# Patient Record
Sex: MALE | Race: WHITE | NOT HISPANIC OR LATINO | Employment: OTHER | URBAN - NONMETROPOLITAN AREA
[De-identification: names, ages, dates, MRNs, and addresses within clinical notes are randomized per-mention and may not be internally consistent; named-entity substitution may affect disease eponyms.]

---

## 2023-05-03 LAB
ALANINE AMINOTRANSFERASE (SGPT) (U/L) IN SER/PLAS: 28 U/L (ref 10–52)
ALBUMIN (G/DL) IN SER/PLAS: 4.5 G/DL (ref 3.4–5)
ALKALINE PHOSPHATASE (U/L) IN SER/PLAS: 93 U/L (ref 33–120)
ANION GAP IN SER/PLAS: 12 MMOL/L (ref 10–20)
ASPARTATE AMINOTRANSFERASE (SGOT) (U/L) IN SER/PLAS: 17 U/L (ref 9–39)
BILIRUBIN TOTAL (MG/DL) IN SER/PLAS: 0.9 MG/DL (ref 0–1.2)
CALCIUM (MG/DL) IN SER/PLAS: 9.6 MG/DL (ref 8.6–10.3)
CARBON DIOXIDE, TOTAL (MMOL/L) IN SER/PLAS: 28 MMOL/L (ref 21–32)
CHLORIDE (MMOL/L) IN SER/PLAS: 104 MMOL/L (ref 98–107)
COBALAMIN (VITAMIN B12) (PG/ML) IN SER/PLAS: 898 PG/ML (ref 211–911)
CREATININE (MG/DL) IN SER/PLAS: 0.84 MG/DL (ref 0.5–1.3)
GFR MALE: >90 ML/MIN/1.73M2
GLUCOSE (MG/DL) IN SER/PLAS: 104 MG/DL (ref 74–99)
MAGNESIUM (MG/DL) IN SER/PLAS: 1.94 MG/DL (ref 1.6–2.4)
POTASSIUM (MMOL/L) IN SER/PLAS: 3.9 MMOL/L (ref 3.5–5.3)
PROTEIN TOTAL: 7.5 G/DL (ref 6.4–8.2)
SODIUM (MMOL/L) IN SER/PLAS: 140 MMOL/L (ref 136–145)
UREA NITROGEN (MG/DL) IN SER/PLAS: 12 MG/DL (ref 6–23)

## 2023-05-04 PROBLEM — E78.2 COMBINED HYPERLIPIDEMIA: Status: ACTIVE | Noted: 2023-05-04

## 2023-05-04 PROBLEM — L03.90 CELLULITIS: Status: ACTIVE | Noted: 2023-05-04

## 2023-05-04 PROBLEM — J84.10 GRANULOMATOUS LUNG DISEASE (MULTI): Status: ACTIVE | Noted: 2023-05-04

## 2023-05-04 PROBLEM — H52.13 BILATERAL MYOPIA: Status: ACTIVE | Noted: 2023-05-04

## 2023-05-04 PROBLEM — J45.40 MODERATE PERSISTENT ASTHMA WITHOUT COMPLICATION (HHS-HCC): Status: ACTIVE | Noted: 2023-05-04

## 2023-05-04 PROBLEM — K80.20 GALL STONES: Status: ACTIVE | Noted: 2023-05-04

## 2023-05-04 PROBLEM — L89.899: Status: ACTIVE | Noted: 2023-05-04

## 2023-05-04 PROBLEM — R59.0 MEDIASTINAL LYMPHADENOPATHY: Status: ACTIVE | Noted: 2023-05-04

## 2023-05-04 PROBLEM — Z89.519 S/P BKA (BELOW KNEE AMPUTATION) UNILATERAL (MULTI): Status: ACTIVE | Noted: 2023-05-04

## 2023-05-04 PROBLEM — Z89.612: Status: ACTIVE | Noted: 2023-05-04

## 2023-05-04 PROBLEM — K52.9 CHRONIC DIARRHEA: Status: ACTIVE | Noted: 2023-05-04

## 2023-05-04 PROBLEM — J45.50 SEVERE PERSISTENT ASTHMA (MULTI): Status: ACTIVE | Noted: 2023-05-04

## 2023-05-04 PROBLEM — L98.9 SKIN LESION: Status: ACTIVE | Noted: 2023-05-04

## 2023-05-04 PROBLEM — G47.30 SLEEP APNEA: Status: ACTIVE | Noted: 2023-05-04

## 2023-05-04 PROBLEM — R93.89 ABNORMAL CHEST X-RAY: Status: ACTIVE | Noted: 2023-05-04

## 2023-05-04 PROBLEM — M54.16 LUMBAR RADICULOPATHY: Status: ACTIVE | Noted: 2023-05-04

## 2023-05-04 PROBLEM — E78.00 PURE HYPERCHOLESTEROLEMIA: Status: ACTIVE | Noted: 2023-05-04

## 2023-05-04 PROBLEM — R91.8 LUNG NODULES: Status: ACTIVE | Noted: 2023-05-04

## 2023-05-04 PROBLEM — J45.41 MODERATE PERSISTENT ASTHMA WITH ACUTE EXACERBATION (HHS-HCC): Status: ACTIVE | Noted: 2023-05-04

## 2023-05-04 PROBLEM — D86.9 SARCOIDOSIS: Status: ACTIVE | Noted: 2023-05-04

## 2023-05-04 PROBLEM — J33.8 NASAL SINUS POLYP: Status: ACTIVE | Noted: 2023-05-04

## 2023-05-04 PROBLEM — M51.36 DDD (DEGENERATIVE DISC DISEASE), LUMBAR: Status: ACTIVE | Noted: 2023-05-04

## 2023-05-04 PROBLEM — M51.369 DDD (DEGENERATIVE DISC DISEASE), LUMBAR: Status: ACTIVE | Noted: 2023-05-04

## 2023-05-04 PROBLEM — J30.89 ALLERGIC RHINITIS DUE TO DUST: Status: ACTIVE | Noted: 2023-05-04

## 2023-05-04 PROBLEM — K92.1 BLOODY STOOLS: Status: ACTIVE | Noted: 2023-05-04

## 2023-05-04 PROBLEM — T84.9XXA: Status: ACTIVE | Noted: 2023-05-04

## 2023-05-04 PROBLEM — R43.0 ANOSMIA: Status: ACTIVE | Noted: 2023-05-04

## 2023-05-04 PROBLEM — I10 ESSENTIAL HYPERTENSION: Status: ACTIVE | Noted: 2023-05-04

## 2023-05-04 PROBLEM — T78.2XXA ANAPHYLACTIC SYNDROME: Status: ACTIVE | Noted: 2023-05-04

## 2023-05-04 PROBLEM — J45.909 ASTHMA (HHS-HCC): Status: ACTIVE | Noted: 2023-05-04

## 2023-05-04 PROBLEM — J34.2 NASAL SEPTAL DEVIATION: Status: ACTIVE | Noted: 2023-05-04

## 2023-05-04 PROBLEM — E55.9 VITAMIN D DEFICIENCY: Status: ACTIVE | Noted: 2023-05-04

## 2023-05-04 PROBLEM — Z86.2 HISTORY OF SARCOIDOSIS: Status: ACTIVE | Noted: 2023-05-04

## 2023-05-04 PROBLEM — K21.9 GERD WITHOUT ESOPHAGITIS: Status: ACTIVE | Noted: 2023-05-04

## 2023-05-04 PROBLEM — M54.9 BACK ARTHRALGIA: Status: ACTIVE | Noted: 2023-05-04

## 2023-05-04 RX ORDER — MONTELUKAST SODIUM 10 MG/1
1 TABLET ORAL DAILY
COMMUNITY
Start: 2016-04-15 | End: 2024-01-16

## 2023-05-04 RX ORDER — MUPIROCIN 20 MG/G
OINTMENT TOPICAL 2 TIMES DAILY
COMMUNITY
Start: 2022-01-10

## 2023-05-04 RX ORDER — TRIAMCINOLONE ACETONIDE 5 MG/G
CREAM TOPICAL 2 TIMES DAILY
COMMUNITY
Start: 2014-05-19

## 2023-05-04 RX ORDER — FLUOCINOLONE ACETONIDE 0.11 MG/ML
OIL AURICULAR (OTIC)
COMMUNITY
Start: 2021-12-11

## 2023-05-04 RX ORDER — FLUTICASONE PROPIONATE AND SALMETEROL 232; 14 UG/1; UG/1
POWDER, METERED RESPIRATORY (INHALATION)
COMMUNITY
Start: 2022-03-18 | End: 2023-12-19 | Stop reason: SDUPTHER

## 2023-05-04 RX ORDER — CLOTRIMAZOLE 1 %
CREAM (GRAM) TOPICAL
COMMUNITY
Start: 2019-01-31

## 2023-05-04 RX ORDER — FLUTICASONE PROPIONATE 93 UG/1
SPRAY, METERED NASAL
COMMUNITY
Start: 2021-03-08

## 2023-05-04 RX ORDER — EPINEPHRINE 0.3 MG/.3ML
INJECTION SUBCUTANEOUS
COMMUNITY
Start: 2017-04-12

## 2023-05-04 RX ORDER — PANTOPRAZOLE SODIUM 40 MG/1
TABLET, DELAYED RELEASE ORAL
COMMUNITY
Start: 2016-04-11 | End: 2023-07-03 | Stop reason: SDUPTHER

## 2023-05-04 RX ORDER — AMLODIPINE BESYLATE 5 MG/1
1 TABLET ORAL DAILY
COMMUNITY
Start: 2017-10-24 | End: 2023-05-09 | Stop reason: SDUPTHER

## 2023-05-04 RX ORDER — ASPIRIN 325 MG
325 TABLET ORAL 2 TIMES DAILY
COMMUNITY
Start: 2016-02-05

## 2023-05-04 RX ORDER — LISINOPRIL 20 MG/1
1 TABLET ORAL DAILY
COMMUNITY
Start: 2015-09-21 | End: 2023-05-17 | Stop reason: SDUPTHER

## 2023-05-04 RX ORDER — CYCLOBENZAPRINE HCL 5 MG
1 TABLET ORAL 3 TIMES DAILY PRN
COMMUNITY
Start: 2021-08-17

## 2023-05-04 RX ORDER — ATORVASTATIN CALCIUM 80 MG/1
1 TABLET, FILM COATED ORAL DAILY
COMMUNITY
Start: 2018-01-24 | End: 2023-05-17 | Stop reason: SDUPTHER

## 2023-05-04 RX ORDER — ALBUTEROL SULFATE 90 UG/1
AEROSOL, METERED RESPIRATORY (INHALATION)
COMMUNITY
Start: 2018-03-21

## 2023-05-04 RX ORDER — MULTIVITAMIN
TABLET ORAL
COMMUNITY
Start: 2022-05-09

## 2023-05-09 ENCOUNTER — OFFICE VISIT (OUTPATIENT)
Dept: PRIMARY CARE | Facility: CLINIC | Age: 43
End: 2023-05-09
Payer: COMMERCIAL

## 2023-05-09 VITALS
BODY MASS INDEX: 29.38 KG/M2 | OXYGEN SATURATION: 96 % | HEART RATE: 64 BPM | WEIGHT: 216.6 LBS | DIASTOLIC BLOOD PRESSURE: 72 MMHG | TEMPERATURE: 97.2 F | SYSTOLIC BLOOD PRESSURE: 110 MMHG

## 2023-05-09 DIAGNOSIS — R60.0 LOCALIZED EDEMA: Primary | ICD-10-CM

## 2023-05-09 DIAGNOSIS — Z86.2 HISTORY OF SARCOIDOSIS: ICD-10-CM

## 2023-05-09 DIAGNOSIS — Z89.612: ICD-10-CM

## 2023-05-09 DIAGNOSIS — J84.10 GRANULOMATOUS LUNG DISEASE (MULTI): ICD-10-CM

## 2023-05-09 PROCEDURE — 99214 OFFICE O/P EST MOD 30 MIN: CPT | Performed by: INTERNAL MEDICINE

## 2023-05-09 PROCEDURE — 3078F DIAST BP <80 MM HG: CPT | Performed by: INTERNAL MEDICINE

## 2023-05-09 PROCEDURE — 3074F SYST BP LT 130 MM HG: CPT | Performed by: INTERNAL MEDICINE

## 2023-05-09 PROCEDURE — 1036F TOBACCO NON-USER: CPT | Performed by: INTERNAL MEDICINE

## 2023-05-09 RX ORDER — AMLODIPINE BESYLATE 2.5 MG/1
2.5 TABLET ORAL DAILY
Qty: 90 TABLET | Refills: 1 | Status: SHIPPED | OUTPATIENT
Start: 2023-05-09 | End: 2023-08-10 | Stop reason: SINTOL

## 2023-05-09 ASSESSMENT — ENCOUNTER SYMPTOMS
COUGH: 0
DIZZINESS: 0
ARTHRALGIAS: 0
DIFFICULTY URINATING: 0
HYPERTENSION: 1
HEADACHES: 0
FEVER: 0
FATIGUE: 0
SINUS PAIN: 0
BLOOD IN STOOL: 0
SORE THROAT: 0
DIARRHEA: 0
BRUISES/BLEEDS EASILY: 0
WHEEZING: 0
PALPITATIONS: 0
UNEXPECTED WEIGHT CHANGE: 0
ABDOMINAL PAIN: 0

## 2023-05-09 NOTE — PROGRESS NOTES
Subjective   Patient ID: Wale Cali is a 43 y.o. male who presents for Results, Edema (Rt foot at end of day), Hyperlipidemia, Asthma, and Hypertension.  Lab Results   Component Value Date    WBC 5.6 02/24/2023    HGB 15.2 02/24/2023    HCT 44.7 02/24/2023     02/24/2023    CHOL 101 05/09/2022    TRIG 80 05/09/2022    HDL 34.0 (A) 05/09/2022    ALT 28 05/03/2023    AST 17 05/03/2023     05/03/2023    K 3.9 05/03/2023     05/03/2023    CREATININE 0.84 05/03/2023    BUN 12 05/03/2023    CO2 28 05/03/2023    TSH 2.30 05/09/2022    INR 1.1 06/10/2021     par   Patient comes today for review blood work  - Blood work reviewed uncontrolled  - Increased leg edema by the end of the day examination trace edema in right leg left below-knee amputation  -Patient counseled about amlodipine side effect and may discontinue 5 mg  Switch patient to 2.5 mg daily follow-up in 3 months to reevaluate edema and swelling and blood pressure  - Spiculated lung lesion follow-up pulmonary, most likely inflammatory seen by pulmonary service recommendation to repeat CT in June follow-up results closely  -Granulomatous lung disease Samter's syndrome between (nasal polyps and asthma aspirin sensitivity,) follow-up pulmonary in January as a scheduled after repeating scan  -Underlying sleep apnea follow-up otolaryngology  -Hypercholesterolemia controlled   -Hypertension controlled continue with amlodipine 5 mg daily  Vitamin D deficiency need to start on vitamin D 5000 daily  -Chronic reflux disease status post upper endoscopy symptoms are controlled only now with pantoprazole on diet-controlled  -Follow-up 3 months      Edema    Pertinent negative symptoms include no abdominal pain, no chest pain, no cough, no fatigue, no fever and no palpitations.   Hyperlipidemia  Pertinent negatives include no chest pain.   Asthma  There is no cough or wheezing. Pertinent negatives include no chest pain, ear pain, fever,  headaches or sore throat. His past medical history is significant for asthma.   Hypertension  Pertinent negatives include no chest pain, headaches or palpitations.          Review of Systems   Constitutional:  Negative for fatigue, fever and unexpected weight change.   HENT:  Negative for congestion, ear discharge, ear pain, mouth sores, sinus pain and sore throat.    Eyes:  Negative for visual disturbance.   Respiratory:  Negative for cough and wheezing.    Cardiovascular:  Positive for leg swelling. Negative for chest pain and palpitations.   Gastrointestinal:  Negative for abdominal pain, blood in stool and diarrhea.   Genitourinary:  Negative for difficulty urinating.   Musculoskeletal:  Negative for arthralgias.   Skin:  Negative for rash.   Neurological:  Negative for dizziness and headaches.   Hematological:  Does not bruise/bleed easily.   Psychiatric/Behavioral:  Negative for behavioral problems.    All other systems reviewed and are negative.      Objective   No results found for: HGBA1C   /72   Pulse 64   Temp 36.2 °C (97.2 °F)   Wt 98.2 kg (216 lb 9.6 oz)   SpO2 96%   BMI 29.38 kg/m²     Physical Exam  Vitals and nursing note reviewed.   Constitutional:       Appearance: Normal appearance.   HENT:      Head: Normocephalic.      Nose: Nose normal.   Eyes:      Conjunctiva/sclera: Conjunctivae normal.      Pupils: Pupils are equal, round, and reactive to light.   Cardiovascular:      Rate and Rhythm: Regular rhythm.   Pulmonary:      Effort: Pulmonary effort is normal.      Breath sounds: Normal breath sounds.   Abdominal:      General: Abdomen is flat.      Palpations: Abdomen is soft.   Musculoskeletal:         General: Deformity (Left below-knee amputation right leg multiple scar tissue from previous surgery) present.      Cervical back: Neck supple.      Right lower leg: Edema present.   Skin:     General: Skin is warm.   Neurological:      General: No focal deficit present.      Mental  Status: He is oriented to person, place, and time.   Psychiatric:         Mood and Affect: Mood normal.         Assessment/Plan   Wale was seen today for results, edema, hyperlipidemia, asthma and hypertension.  Diagnoses and all orders for this visit:  Localized edema (Primary)  -     amLODIPine (Norvasc) 2.5 mg tablet; Take 1 tablet (2.5 mg) by mouth once daily.  Granulomatous lung disease (CMS/HCC)  History of left lower extremity amputation (CMS/HCC)  History of sarcoidosis  Other orders  -     Follow Up In Primary Care; Future  -     Follow Up In Primary Care; Future   Patient comes today for review blood work  - Blood work reviewed uncontrolled  - Increased leg edema by the end of the day examination trace edema in right leg left below-knee amputation  -Patient counseled about amlodipine side effect and may discontinue 5 mg  Switch patient to 2.5 mg daily follow-up in 3 months to reevaluate edema and swelling and blood pressure  - Spiculated lung lesion follow-up pulmonary, most likely inflammatory seen by pulmonary service recommendation to repeat CT in June follow-up results closely  -Granulomatous lung disease Samter's syndrome between (nasal polyps and asthma aspirin sensitivity,) follow-up pulmonary in January as a scheduled after repeating scan  -Underlying sleep apnea follow-up otolaryngology  -Hypercholesterolemia controlled   -Hypertension controlled continue with amlodipine 5 mg daily  Vitamin D deficiency need to start on vitamin D 5000 daily  -Chronic reflux disease status post upper endoscopy symptoms are controlled only now with pantoprazole on diet-controlled  -Follow-up 3 months

## 2023-05-17 DIAGNOSIS — E78.00 PURE HYPERCHOLESTEROLEMIA: ICD-10-CM

## 2023-05-17 DIAGNOSIS — I10 ESSENTIAL HYPERTENSION: ICD-10-CM

## 2023-05-17 RX ORDER — ATORVASTATIN CALCIUM 80 MG/1
TABLET, FILM COATED ORAL
Qty: 90 TABLET | Refills: 0 | Status: SHIPPED | OUTPATIENT
Start: 2023-05-17 | End: 2023-08-10 | Stop reason: SDUPTHER

## 2023-05-17 RX ORDER — LISINOPRIL 20 MG/1
TABLET ORAL
Qty: 90 TABLET | Refills: 0 | Status: SHIPPED | OUTPATIENT
Start: 2023-05-17 | End: 2023-08-10 | Stop reason: SDUPTHER

## 2023-07-01 DIAGNOSIS — K21.9 GERD WITHOUT ESOPHAGITIS: ICD-10-CM

## 2023-07-03 RX ORDER — PANTOPRAZOLE SODIUM 40 MG/1
TABLET, DELAYED RELEASE ORAL
Qty: 90 TABLET | Refills: 0 | Status: SHIPPED | OUTPATIENT
Start: 2023-07-03 | End: 2023-10-09

## 2023-08-10 ENCOUNTER — OFFICE VISIT (OUTPATIENT)
Dept: PRIMARY CARE | Facility: CLINIC | Age: 43
End: 2023-08-10
Payer: COMMERCIAL

## 2023-08-10 VITALS
DIASTOLIC BLOOD PRESSURE: 70 MMHG | BODY MASS INDEX: 29.54 KG/M2 | OXYGEN SATURATION: 97 % | HEART RATE: 58 BPM | TEMPERATURE: 98.6 F | WEIGHT: 217.8 LBS | SYSTOLIC BLOOD PRESSURE: 120 MMHG

## 2023-08-10 DIAGNOSIS — J84.10 GRANULOMATOUS LUNG DISEASE (MULTI): ICD-10-CM

## 2023-08-10 DIAGNOSIS — E78.00 PURE HYPERCHOLESTEROLEMIA: ICD-10-CM

## 2023-08-10 DIAGNOSIS — Z89.612: ICD-10-CM

## 2023-08-10 DIAGNOSIS — I10 ESSENTIAL HYPERTENSION: Primary | ICD-10-CM

## 2023-08-10 DIAGNOSIS — R60.0 LOCALIZED EDEMA: ICD-10-CM

## 2023-08-10 PROCEDURE — 1036F TOBACCO NON-USER: CPT | Performed by: INTERNAL MEDICINE

## 2023-08-10 PROCEDURE — 3074F SYST BP LT 130 MM HG: CPT | Performed by: INTERNAL MEDICINE

## 2023-08-10 PROCEDURE — 3078F DIAST BP <80 MM HG: CPT | Performed by: INTERNAL MEDICINE

## 2023-08-10 PROCEDURE — 99214 OFFICE O/P EST MOD 30 MIN: CPT | Performed by: INTERNAL MEDICINE

## 2023-08-10 RX ORDER — AMLODIPINE BESYLATE 2.5 MG/1
2.5 TABLET ORAL DAILY
Qty: 90 TABLET | Refills: 1 | Status: CANCELLED | OUTPATIENT
Start: 2023-08-10 | End: 2024-02-06

## 2023-08-10 RX ORDER — ATORVASTATIN CALCIUM 80 MG/1
80 TABLET, FILM COATED ORAL DAILY
Qty: 90 TABLET | Refills: 0 | Status: SHIPPED | OUTPATIENT
Start: 2023-08-10 | End: 2023-11-20

## 2023-08-10 RX ORDER — LISINOPRIL 20 MG/1
20 TABLET ORAL DAILY
Qty: 90 TABLET | Refills: 0 | Status: SHIPPED | OUTPATIENT
Start: 2023-08-10 | End: 2023-11-20

## 2023-08-10 ASSESSMENT — ENCOUNTER SYMPTOMS
HEADACHES: 0
ARTHRALGIAS: 0
WHEEZING: 0
PALPITATIONS: 0
UNEXPECTED WEIGHT CHANGE: 0
DIZZINESS: 0
SORE THROAT: 0
BLOOD IN STOOL: 0
FEVER: 0
FATIGUE: 0
BRUISES/BLEEDS EASILY: 0
SINUS PAIN: 0
ABDOMINAL PAIN: 0
COUGH: 0
DIFFICULTY URINATING: 0
DIARRHEA: 0

## 2023-08-10 ASSESSMENT — PATIENT HEALTH QUESTIONNAIRE - PHQ9
2. FEELING DOWN, DEPRESSED OR HOPELESS: NOT AT ALL
SUM OF ALL RESPONSES TO PHQ9 QUESTIONS 1 AND 2: 0
1. LITTLE INTEREST OR PLEASURE IN DOING THINGS: NOT AT ALL

## 2023-08-10 NOTE — PROGRESS NOTES
Subjective   Patient ID: Wale Cali is a 43 y.o. male who presents for Follow-up (3 mth follow up. ), Medication Problem (Amlodipine was to be cut in half the last time he was here. Foot is still swollen. His father's foot gets swollen when was on amlodipine. Could it be hereditary?  ), and bumps (On hand.).     -Leg edema did not improve after cutting down amlodipine  Need to discontinue amlodipine since her blood pressure is doing very well  Hypertension controlled to continue with lisinopril reevaluate patient in 3 months  - Spiculated lung lesion follow-up pulmonary, most likely inflammatory seen by pulmonary service recommendation to repeat CT in June follow-up results closely  -Granulomatous lung disease Samter's syndrome between (nasal polyps and asthma aspirin sensitivity,) follow-up pulmonary in January as a scheduled after repeating scan  -Underlying sleep apnea follow-up otolaryngology  -Hypercholesterolemia controlled   -Vitamin D deficiency need to start on vitamin D 5000 daily  -Chronic reflux disease status post upper endoscopy symptoms are controlled only now with pantoprazole on diet-controlled  -Follow-up 3 months                Review of Systems   Constitutional:  Negative for fatigue, fever and unexpected weight change.   HENT:  Negative for congestion, ear discharge, ear pain, mouth sores, sinus pain and sore throat.    Eyes:  Negative for visual disturbance.   Respiratory:  Negative for cough and wheezing.    Cardiovascular:  Positive for leg swelling. Negative for chest pain and palpitations.   Gastrointestinal:  Negative for abdominal pain, blood in stool and diarrhea.   Genitourinary:  Negative for difficulty urinating.   Musculoskeletal:  Negative for arthralgias.   Skin:  Negative for rash.   Neurological:  Negative for dizziness and headaches.   Hematological:  Does not bruise/bleed easily.   Psychiatric/Behavioral:  Negative for behavioral problems.    All other systems  "reviewed and are negative.      Objective   No results found for: \"HGBA1C\"   /70   Pulse 58   Temp 37 °C (98.6 °F)   Wt 98.8 kg (217 lb 12.8 oz)   SpO2 97%   BMI 29.54 kg/m²     Physical Exam  Vitals and nursing note reviewed.   Constitutional:       Appearance: Normal appearance.   HENT:      Head: Normocephalic.      Nose: Nose normal.   Eyes:      Conjunctiva/sclera: Conjunctivae normal.      Pupils: Pupils are equal, round, and reactive to light.   Cardiovascular:      Rate and Rhythm: Regular rhythm.   Pulmonary:      Effort: Pulmonary effort is normal.      Breath sounds: Normal breath sounds.   Abdominal:      General: Abdomen is flat.      Palpations: Abdomen is soft.   Musculoskeletal:      Cervical back: Neck supple.      Right lower leg: Edema present.      Comments: Left below knee amputation   Skin:     General: Skin is warm.   Neurological:      General: No focal deficit present.      Mental Status: He is oriented to person, place, and time.   Psychiatric:         Mood and Affect: Mood normal.         Assessment/Plan   Wale was seen today for follow-up, medication problem and bumps.  Diagnoses and all orders for this visit:  Essential hypertension (Primary)  -     lisinopril 20 mg tablet; Take 1 tablet (20 mg) by mouth once daily.  Localized edema  Pure hypercholesterolemia  -     atorvastatin (Lipitor) 80 mg tablet; Take 1 tablet (80 mg) by mouth once daily.  History of left lower extremity amputation (CMS/HCC)  Granulomatous lung disease (CMS/HCC)  Other orders  -     Follow Up In Primary Care - Established; Future    -Leg edema did not improve after cutting down amlodipine  Need to discontinue amlodipine since her blood pressure is doing very well  Hypertension controlled to continue with lisinopril reevaluate patient in 3 months  - Spiculated lung lesion follow-up pulmonary, most likely inflammatory seen by pulmonary service recommendation to repeat CT in June follow-up results " closely  -Granulomatous lung disease Samter's syndrome between (nasal polyps and asthma aspirin sensitivity,) follow-up pulmonary in January as a scheduled after repeating scan  -Underlying sleep apnea follow-up otolaryngology  -Hypercholesterolemia controlled   -Vitamin D deficiency need to start on vitamin D 5000 daily  -Chronic reflux disease status post upper endoscopy symptoms are controlled only now with pantoprazole on diet-controlled  -Follow-up 3 months

## 2023-10-04 ENCOUNTER — CLINICAL SUPPORT (OUTPATIENT)
Dept: ALLERGY | Facility: CLINIC | Age: 43
End: 2023-10-04
Payer: COMMERCIAL

## 2023-10-04 DIAGNOSIS — T78.2XXA ANAPHYLAXIS, INITIAL ENCOUNTER: ICD-10-CM

## 2023-10-04 DIAGNOSIS — J30.89 NON-SEASONAL ALLERGIC RHINITIS, UNSPECIFIED TRIGGER: ICD-10-CM

## 2023-10-04 DIAGNOSIS — Z91.09 OTHER ALLERGY, OTHER THAN TO MEDICINAL AGENTS: ICD-10-CM

## 2023-10-04 DIAGNOSIS — J30.81 ALLERGIC RHINITIS DUE TO ANIMAL (CAT) (DOG) HAIR AND DANDER: ICD-10-CM

## 2023-10-04 DIAGNOSIS — J45.50 SEVERE PERSISTENT ASTHMA, UNSPECIFIED WHETHER COMPLICATED (MULTI): ICD-10-CM

## 2023-10-04 DIAGNOSIS — J30.9 ALLERGIC RHINITIS, UNSPECIFIED SEASONALITY, UNSPECIFIED TRIGGER: ICD-10-CM

## 2023-10-04 PROCEDURE — 95117 IMMUNOTHERAPY INJECTIONS: CPT | Performed by: ALLERGY & IMMUNOLOGY

## 2023-10-04 PROCEDURE — 96401 CHEMO ANTI-NEOPL SQ/IM: CPT | Performed by: ALLERGY & IMMUNOLOGY

## 2023-10-04 PROCEDURE — 96372 THER/PROPH/DIAG INJ SC/IM: CPT | Performed by: ALLERGY & IMMUNOLOGY

## 2023-10-07 DIAGNOSIS — K21.9 GERD WITHOUT ESOPHAGITIS: ICD-10-CM

## 2023-10-09 RX ORDER — PANTOPRAZOLE SODIUM 40 MG/1
TABLET, DELAYED RELEASE ORAL
Qty: 90 TABLET | Refills: 0 | Status: SHIPPED | OUTPATIENT
Start: 2023-10-09 | End: 2024-01-15

## 2023-11-01 ENCOUNTER — CLINICAL SUPPORT (OUTPATIENT)
Dept: ALLERGY | Facility: CLINIC | Age: 43
End: 2023-11-01
Payer: COMMERCIAL

## 2023-11-01 DIAGNOSIS — J30.89 NON-SEASONAL ALLERGIC RHINITIS, UNSPECIFIED TRIGGER: ICD-10-CM

## 2023-11-01 DIAGNOSIS — J30.81 ALLERGIC RHINITIS DUE TO ANIMAL (CAT) (DOG) HAIR AND DANDER: ICD-10-CM

## 2023-11-01 DIAGNOSIS — J30.9 ALLERGIC RHINITIS, UNSPECIFIED SEASONALITY, UNSPECIFIED TRIGGER: ICD-10-CM

## 2023-11-01 PROCEDURE — 95117 IMMUNOTHERAPY INJECTIONS: CPT | Performed by: ALLERGY & IMMUNOLOGY

## 2023-11-16 ENCOUNTER — OFFICE VISIT (OUTPATIENT)
Dept: PRIMARY CARE | Facility: CLINIC | Age: 43
End: 2023-11-16
Payer: COMMERCIAL

## 2023-11-16 ENCOUNTER — HOSPITAL ENCOUNTER (OUTPATIENT)
Dept: RADIOLOGY | Facility: HOSPITAL | Age: 43
Discharge: HOME | End: 2023-11-16
Payer: COMMERCIAL

## 2023-11-16 VITALS
HEIGHT: 70 IN | WEIGHT: 222.2 LBS | SYSTOLIC BLOOD PRESSURE: 118 MMHG | HEART RATE: 72 BPM | OXYGEN SATURATION: 97 % | TEMPERATURE: 97.7 F | BODY MASS INDEX: 31.81 KG/M2 | DIASTOLIC BLOOD PRESSURE: 84 MMHG

## 2023-11-16 DIAGNOSIS — M25.521 RIGHT ELBOW PAIN: ICD-10-CM

## 2023-11-16 DIAGNOSIS — I10 ESSENTIAL HYPERTENSION: ICD-10-CM

## 2023-11-16 DIAGNOSIS — Z00.00 ANNUAL PHYSICAL EXAM: Primary | ICD-10-CM

## 2023-11-16 DIAGNOSIS — E78.00 PURE HYPERCHOLESTEROLEMIA: ICD-10-CM

## 2023-11-16 DIAGNOSIS — J84.10 GRANULOMATOUS LUNG DISEASE (MULTI): ICD-10-CM

## 2023-11-16 DIAGNOSIS — Z86.2 HISTORY OF SARCOIDOSIS: ICD-10-CM

## 2023-11-16 DIAGNOSIS — J45.40 MODERATE PERSISTENT ASTHMA WITHOUT COMPLICATION (HHS-HCC): ICD-10-CM

## 2023-11-16 DIAGNOSIS — L60.0 INGROWING TOENAIL OF RIGHT FOOT: ICD-10-CM

## 2023-11-16 PROCEDURE — 73080 X-RAY EXAM OF ELBOW: CPT | Mod: RT,FY

## 2023-11-16 PROCEDURE — 99214 OFFICE O/P EST MOD 30 MIN: CPT | Performed by: INTERNAL MEDICINE

## 2023-11-16 PROCEDURE — 3079F DIAST BP 80-89 MM HG: CPT | Performed by: INTERNAL MEDICINE

## 2023-11-16 PROCEDURE — 1036F TOBACCO NON-USER: CPT | Performed by: INTERNAL MEDICINE

## 2023-11-16 PROCEDURE — 73080 X-RAY EXAM OF ELBOW: CPT | Mod: RIGHT SIDE | Performed by: RADIOLOGY

## 2023-11-16 PROCEDURE — 99396 PREV VISIT EST AGE 40-64: CPT | Performed by: INTERNAL MEDICINE

## 2023-11-16 PROCEDURE — 3074F SYST BP LT 130 MM HG: CPT | Performed by: INTERNAL MEDICINE

## 2023-11-16 RX ORDER — METHYLPREDNISOLONE 4 MG/1
TABLET ORAL
Qty: 21 TABLET | Refills: 0 | Status: SHIPPED | OUTPATIENT
Start: 2023-11-16 | End: 2023-11-23

## 2023-11-16 ASSESSMENT — ENCOUNTER SYMPTOMS
UNEXPECTED WEIGHT CHANGE: 0
FEVER: 0
COLOR CHANGE: 0
SINUS PAIN: 0
BLOOD IN STOOL: 0
DIARRHEA: 0
WHEEZING: 1
LOSS OF SENSATION IN FEET: 1
DEPRESSION: 0
COUGH: 0
HEADACHES: 0
FATIGUE: 0
PALPITATIONS: 0
ABDOMINAL PAIN: 0
OCCASIONAL FEELINGS OF UNSTEADINESS: 0
BRUISES/BLEEDS EASILY: 0
SORE THROAT: 0
DIFFICULTY URINATING: 0
ARTHRALGIAS: 1
DIZZINESS: 0

## 2023-11-16 ASSESSMENT — PATIENT HEALTH QUESTIONNAIRE - PHQ9
1. LITTLE INTEREST OR PLEASURE IN DOING THINGS: NOT AT ALL
SUM OF ALL RESPONSES TO PHQ9 QUESTIONS 1 AND 2: 0
2. FEELING DOWN, DEPRESSED OR HOPELESS: NOT AT ALL

## 2023-11-16 ASSESSMENT — COLUMBIA-SUICIDE SEVERITY RATING SCALE - C-SSRS
2. HAVE YOU ACTUALLY HAD ANY THOUGHTS OF KILLING YOURSELF?: NO
1. IN THE PAST MONTH, HAVE YOU WISHED YOU WERE DEAD OR WISHED YOU COULD GO TO SLEEP AND NOT WAKE UP?: NO
6. HAVE YOU EVER DONE ANYTHING, STARTED TO DO ANYTHING, OR PREPARED TO DO ANYTHING TO END YOUR LIFE?: NO

## 2023-11-16 NOTE — PATIENT INSTRUCTIONS

## 2023-11-16 NOTE — PROGRESS NOTES
Subjective   Patient ID: Wale Cali is a 43 y.o. male who presents for Annual Exam, Asthma (Ears itchy, asthma acting up, trouble breathing, had leftover steroid and it helped), Ingrown Toenail (Referral to Dr. Cazares), Elbow Pain (Right elbow, burning, pain when leans on it), and Flu Vaccine (Decline).      -Annual preventive visit  -Vaccinations reviewed patient declined vaccine declined flu vaccine aware of risk and benefit  Needs screening colonoscopy  Declined at this time  - Needs complete blood work    Follow-up  -Right big toe ingrown toenail patient need to be referred to podiatry for further recommendation  - Right elbow pain and burning sensation on touching the elbow it is unclear to me to obtain x-ray  Refer patient to orthopedic surgery  -Hypertension controlled to continue with lisinopril reevaluate patient in 3 months  Patient off amlodipine edema improved  - Spiculated lung lesion follow-up pulmonary, most likely inflammatory seen by pulmonary service recommendation CT showed granulomatous lung disease follow-up CT per pulmonary recommendation  t-Granulomatous lung disease Samter's syndrome between (nasal polyps and asthma aspirin sensitivity,) follow-up pulmonary in January as a scheduled after repeating scan  - Underlying asthma continue with current inhaler  Flareup add Medrol Dosepak  -Underlying sleep apnea follow-up otolaryngology  -Hypercholesterolemia controlled   -Vitamin D deficiency need to start on vitamin D 5000 daily  -Chronic reflux disease status post upper endoscopy symptoms are controlled only now with pantoprazole on diet-controlled  -Follow-up 3 months      Asthma  He complains of wheezing. There is no cough. Pertinent negatives include no chest pain, ear pain, fever, headaches or sore throat. His past medical history is significant for asthma.   Elbow Pain  Associated symptoms include arthralgias. Pertinent negatives include no abdominal pain, chest pain,  "congestion, coughing, fatigue, fever, headaches, rash or sore throat.          Review of Systems   Constitutional:  Negative for fatigue, fever and unexpected weight change.   HENT:  Negative for congestion, ear discharge, ear pain, mouth sores, sinus pain and sore throat.    Eyes:  Negative for visual disturbance.   Respiratory:  Positive for wheezing. Negative for cough.    Cardiovascular:  Negative for chest pain, palpitations and leg swelling.   Gastrointestinal:  Negative for abdominal pain, blood in stool and diarrhea.   Genitourinary:  Negative for difficulty urinating.   Musculoskeletal:  Positive for arthralgias.   Skin:  Negative for color change and rash.   Neurological:  Negative for dizziness and headaches.   Hematological:  Does not bruise/bleed easily.   Psychiatric/Behavioral:  Negative for behavioral problems.    All other systems reviewed and are negative.      Objective   No results found for: \"HGBA1C\"   /84   Pulse 72   Temp 36.5 °C (97.7 °F)   Ht 1.778 m (5' 10\")   Wt 101 kg (222 lb 3.2 oz)   SpO2 97%   BMI 31.88 kg/m²   Lab Results   Component Value Date    WBC 5.6 02/24/2023    HGB 15.2 02/24/2023    HCT 44.7 02/24/2023     02/24/2023    CHOL 101 05/09/2022    TRIG 80 05/09/2022    HDL 34.0 (A) 05/09/2022    ALT 28 05/03/2023    AST 17 05/03/2023     05/03/2023    K 3.9 05/03/2023     05/03/2023    CREATININE 0.84 05/03/2023    BUN 12 05/03/2023    CO2 28 05/03/2023    TSH 2.30 05/09/2022    INR 1.1 06/10/2021     par   Physical Exam  Vitals and nursing note reviewed.   Constitutional:       Appearance: Normal appearance.   HENT:      Head: Normocephalic.      Nose: Nose normal.   Eyes:      Conjunctiva/sclera: Conjunctivae normal.      Pupils: Pupils are equal, round, and reactive to light.   Cardiovascular:      Rate and Rhythm: Regular rhythm.   Pulmonary:      Effort: Pulmonary effort is normal.      Breath sounds: Normal breath sounds.   Abdominal:      " General: Abdomen is flat.      Palpations: Abdomen is soft.   Musculoskeletal:         General: Tenderness (Right ingrowing toenail) present.      Cervical back: Neck supple.   Skin:     General: Skin is warm.      Comments: Right ingrown toenail   Neurological:      General: No focal deficit present.      Mental Status: He is oriented to person, place, and time.   Psychiatric:         Mood and Affect: Mood normal.         Assessment/Plan   Wale was seen today for annual exam, asthma, ingrown toenail, elbow pain and flu vaccine.  Diagnoses and all orders for this visit:  Annual physical exam (Primary)  -     CBC and Auto Differential; Future  -     Comprehensive Metabolic Panel; Future  -     Hemoglobin A1C; Future  -     Lipid Panel; Future  -     TSH with reflex to Free T4 if abnormal; Future  Granulomatous lung disease (CMS/HCC)  Ingrowing toenail of right foot  -     Referral to Podiatry; Future  Right elbow pain  -     Referral to Orthopaedic Surgery; Future  -     XR elbow right T 3+ views; Future  Moderate persistent asthma without complication  -     methylPREDNISolone (Medrol Dospak) 4 mg tablets; Follow schedule on package instructions  Essential hypertension  History of sarcoidosis  Pure hypercholesterolemia  Other orders  -     Follow Up In Primary Care - Established; Future   Patient was identified as a fall risk. Risk prevention instructions provided.  -Annual preventive visit  -Vaccinations reviewed patient declined vaccine declined flu vaccine aware of risk and benefit  Needs screening colonoscopy  Declined at this time  - Needs complete blood work    Follow-up  -Right big toe ingrown toenail patient need to be referred to podiatry for further recommendation  - Right elbow pain and burning sensation on touching the elbow it is unclear to me to obtain x-ray  Refer patient to orthopedic surgery  -Hypertension controlled to continue with lisinopril reevaluate patient in 3 months  Patient off  amlodipine edema improved  - Spiculated lung lesion follow-up pulmonary, most likely inflammatory seen by pulmonary service recommendation CT showed granulomatous lung disease follow-up CT per pulmonary recommendation  t-Granulomatous lung disease Samter's syndrome between (nasal polyps and asthma aspirin sensitivity,) follow-up pulmonary in January as a scheduled after repeating scan  - Underlying asthma continue with current inhaler  Flareup add Medrol Dosepak  -Underlying sleep apnea follow-up otolaryngology  -Hypercholesterolemia controlled   -Vitamin D deficiency need to start on vitamin D 5000 daily  -Chronic reflux disease status post upper endoscopy symptoms are controlled only now with pantoprazole on diet-controlled  -Follow-up 3 months

## 2023-11-18 DIAGNOSIS — I10 ESSENTIAL HYPERTENSION: ICD-10-CM

## 2023-11-18 DIAGNOSIS — E78.00 PURE HYPERCHOLESTEROLEMIA: ICD-10-CM

## 2023-11-20 RX ORDER — LISINOPRIL 20 MG/1
20 TABLET ORAL DAILY
Qty: 90 TABLET | Refills: 1 | Status: SHIPPED | OUTPATIENT
Start: 2023-11-20 | End: 2024-02-15 | Stop reason: SDUPTHER

## 2023-11-20 RX ORDER — ATORVASTATIN CALCIUM 80 MG/1
80 TABLET, FILM COATED ORAL DAILY
Qty: 90 TABLET | Refills: 1 | Status: SHIPPED | OUTPATIENT
Start: 2023-11-20 | End: 2024-02-15 | Stop reason: SDUPTHER

## 2023-12-14 ENCOUNTER — OFFICE VISIT (OUTPATIENT)
Dept: ORTHOPEDIC SURGERY | Facility: CLINIC | Age: 43
End: 2023-12-14
Payer: COMMERCIAL

## 2023-12-14 VITALS — BODY MASS INDEX: 31.78 KG/M2 | WEIGHT: 222 LBS | HEIGHT: 70 IN

## 2023-12-14 DIAGNOSIS — M77.11 LATERAL EPICONDYLITIS OF RIGHT ELBOW: ICD-10-CM

## 2023-12-14 PROCEDURE — 1036F TOBACCO NON-USER: CPT | Performed by: ORTHOPAEDIC SURGERY

## 2023-12-14 PROCEDURE — 99203 OFFICE O/P NEW LOW 30 MIN: CPT | Performed by: ORTHOPAEDIC SURGERY

## 2023-12-14 ASSESSMENT — PAIN - FUNCTIONAL ASSESSMENT: PAIN_FUNCTIONAL_ASSESSMENT: NO/DENIES PAIN
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
## 2023-12-19 ENCOUNTER — TELEMEDICINE (OUTPATIENT)
Dept: PRIMARY CARE | Facility: CLINIC | Age: 43
End: 2023-12-19
Payer: COMMERCIAL

## 2023-12-19 DIAGNOSIS — J45.40 MODERATE PERSISTENT ASTHMA WITHOUT COMPLICATION (HHS-HCC): Primary | ICD-10-CM

## 2023-12-19 DIAGNOSIS — J84.10 GRANULOMATOUS LUNG DISEASE (MULTI): ICD-10-CM

## 2023-12-19 DIAGNOSIS — I10 ESSENTIAL HYPERTENSION: ICD-10-CM

## 2023-12-19 PROCEDURE — 99213 OFFICE O/P EST LOW 20 MIN: CPT | Performed by: INTERNAL MEDICINE

## 2023-12-19 RX ORDER — PREDNISONE 20 MG/1
20 TABLET ORAL DAILY
Qty: 7 TABLET | Refills: 0 | Status: SHIPPED | OUTPATIENT
Start: 2023-12-19 | End: 2023-12-26

## 2023-12-19 RX ORDER — AMOXICILLIN AND CLAVULANATE POTASSIUM 875; 125 MG/1; MG/1
875 TABLET, FILM COATED ORAL 2 TIMES DAILY
Qty: 20 TABLET | Refills: 0 | Status: SHIPPED | OUTPATIENT
Start: 2023-12-19 | End: 2023-12-29

## 2023-12-19 RX ORDER — FLUTICASONE PROPIONATE AND SALMETEROL 232; 14 UG/1; UG/1
1 POWDER, METERED RESPIRATORY (INHALATION)
Qty: 1 EACH | Refills: 1 | Status: SHIPPED | OUTPATIENT
Start: 2023-12-19 | End: 2024-02-27

## 2023-12-19 RX ORDER — FLUTICASONE PROPIONATE AND SALMETEROL 232; 14 UG/1; UG/1
1 POWDER, METERED RESPIRATORY (INHALATION)
Qty: 1 EACH | Refills: 1 | Status: SHIPPED | OUTPATIENT
Start: 2023-12-19 | End: 2023-12-19 | Stop reason: SDUPTHER

## 2023-12-19 RX ORDER — AZELASTINE 1 MG/ML
SPRAY, METERED NASAL
COMMUNITY
Start: 2023-12-12

## 2023-12-19 ASSESSMENT — ENCOUNTER SYMPTOMS
FATIGUE: 0
ABDOMINAL PAIN: 0
DIZZINESS: 0
BRUISES/BLEEDS EASILY: 0
BLOOD IN STOOL: 0
WHEEZING: 1
DIARRHEA: 0
COUGH: 1
DIFFICULTY URINATING: 0
ARTHRALGIAS: 0
UNEXPECTED WEIGHT CHANGE: 0
PALPITATIONS: 0
HEADACHES: 0
SINUS PAIN: 0
SORE THROAT: 0
FEVER: 0

## 2023-12-19 NOTE — PROGRESS NOTES
Subjective   Patient ID: Wale Cali is a 43 y.o. male who presents for Asthma (Exacerbation would like steroid ).     - Patient complaining of cough wheezing ran out of his inhaler no fever no chills  - Underlying history with exacerbation now  Patient given refill on his current inhaler until seeing his pulmonologist  Start on Augmentin twice a day for 10 days  - Add prednisone 20 mg daily for 7 days follow-up if no improvement  -Hypertension controlled to continue with lisinopril reevaluate patient in 3 months  Patient off amlodipine edema improved  - Spiculated lung lesion follow-up pulmonary, most likely inflammatory seen by pulmonary service recommendation CT showed granulomatous lung disease follow-up CT per pulmonary recommendation  t-Granulomatous lung disease Samter's syndrome between (nasal polyps and asthma aspirin sensitivity,) follow-up pulmonary in January as a scheduled after repeating scan  -Underlying sleep apnea follow-up otolaryngology  -Hypercholesterolemia controlled   -Vitamin D deficiency need to start on vitamin D 5000 daily  -Chronic reflux disease status post upper endoscopy symptoms are controlled only now with pantoprazole on diet-controlled  -Follow-up 3 months, as a scheduled         Asthma  He complains of cough and wheezing. Pertinent negatives include no chest pain, ear pain, fever, headaches or sore throat. His past medical history is significant for asthma.          Review of Systems   Constitutional:  Negative for fatigue, fever and unexpected weight change.   HENT:  Negative for congestion, ear discharge, ear pain, mouth sores, sinus pain and sore throat.    Eyes:  Negative for visual disturbance.   Respiratory:  Positive for cough and wheezing.    Cardiovascular:  Negative for chest pain, palpitations and leg swelling.   Gastrointestinal:  Negative for abdominal pain, blood in stool and diarrhea.   Genitourinary:  Negative for difficulty urinating.  "  Musculoskeletal:  Negative for arthralgias.   Skin:  Negative for rash.   Neurological:  Negative for dizziness and headaches.   Hematological:  Does not bruise/bleed easily.   Psychiatric/Behavioral:  Negative for behavioral problems.    All other systems reviewed and are negative.      Objective   No results found for: \"HGBA1C\"   There were no vitals taken for this visit.    Physical Exam  Constitutional:       General: He is not in acute distress.     Appearance: He is not ill-appearing, toxic-appearing or diaphoretic.   Neurological:      Mental Status: He is alert.   Psychiatric:         Mood and Affect: Mood normal.         Assessment/Plan   Wale was seen today for asthma.  Diagnoses and all orders for this visit:  Moderate persistent asthma without complication (Primary)  -     amoxicillin-pot clavulanate (Augmentin) 875-125 mg tablet; Take 1 tablet (875 mg) by mouth 2 times a day for 10 days.  -     predniSONE (Deltasone) 20 mg tablet; Take 1 tablet (20 mg) by mouth once daily for 7 days.  Granulomatous lung disease (CMS/[facility])  -     fluticasone propion-salmeteroL (AirDuo Digihaler) 232-14 mcg/actuation inhaler; Inhale 1 puff 2 times a day.  Essential hypertension   - Patient complaining of cough wheezing ran out of his inhaler no fever no chills  - Underlying history with exacerbation now  Patient given refill on his current inhaler until seeing his pulmonologist  Start on Augmentin twice a day for 10 days  - Add prednisone 20 mg daily for 7 days follow-up if no improvement  -Hypertension controlled to continue with lisinopril reevaluate patient in 3 months  Patient off amlodipine edema improved  - Spiculated lung lesion follow-up pulmonary, most likely inflammatory seen by pulmonary service recommendation CT showed granulomatous lung disease follow-up CT per pulmonary recommendation  t-Granulomatous lung disease Samter's syndrome between (nasal polyps and asthma aspirin sensitivity,) follow-up " pulmonary in January as a scheduled after repeating scan  -Underlying sleep apnea follow-up otolaryngology  -Hypercholesterolemia controlled   -Vitamin D deficiency need to start on vitamin D 5000 daily  -Chronic reflux disease status post upper endoscopy symptoms are controlled only now with pantoprazole on diet-controlled  -Follow-up 3 months, as a scheduled

## 2023-12-28 ENCOUNTER — EVALUATION (OUTPATIENT)
Dept: OCCUPATIONAL THERAPY | Facility: CLINIC | Age: 43
End: 2023-12-28
Payer: COMMERCIAL

## 2023-12-28 DIAGNOSIS — M77.11 LATERAL EPICONDYLITIS OF RIGHT ELBOW: Primary | ICD-10-CM

## 2023-12-28 DIAGNOSIS — M77.11 RIGHT LATERAL EPICONDYLITIS: ICD-10-CM

## 2023-12-28 PROCEDURE — 97165 OT EVAL LOW COMPLEX 30 MIN: CPT | Mod: GO | Performed by: OCCUPATIONAL THERAPIST

## 2023-12-28 PROCEDURE — 97110 THERAPEUTIC EXERCISES: CPT | Mod: GO | Performed by: OCCUPATIONAL THERAPIST

## 2023-12-28 ASSESSMENT — PAIN - FUNCTIONAL ASSESSMENT: PAIN_FUNCTIONAL_ASSESSMENT: 0-10

## 2023-12-28 ASSESSMENT — PAIN DESCRIPTION - DESCRIPTORS: DESCRIPTORS: ACHING

## 2023-12-28 ASSESSMENT — ENCOUNTER SYMPTOMS
OCCASIONAL FEELINGS OF UNSTEADINESS: 0
LOSS OF SENSATION IN FEET: 0
DEPRESSION: 0

## 2023-12-28 ASSESSMENT — PAIN SCALES - GENERAL: PAINLEVEL_OUTOF10: 4

## 2023-12-28 NOTE — PROGRESS NOTES
"    Occupational Therapy  Occupational Therapy Orthopedic Evaluation    Patient Name: Wale Cali  MRN: [id number]  Today's Date: 12/28/2023       Insurance:  Visit number: 1 of 8  Insurance Type: [facility]      General:  Reason for visit: R elbow pain   Referred by: Momo     Current Problem  1. Right lateral epicondylitis        2. Lateral epicondylitis of right elbow  Referral to Occupational Therapy          Precautions: none         Medical History Form: Reviewed (scanned into chart)    Subjective:   Chief Complaint: \"It hurts all the time now.\"  Onset: Patient reports initial onset 7 years ago with more acute progression of symptoms 6/1/23.   TICO: Overuse Patient reports initially injuring his elbow while \"swatting at bees\" 7 years ago. Patient also reports more recent progression of symptoms following using a chain saw on 6/1/23.   DOI/DOS: 6/1/23      Hand Dominance: Right    Current Condition since injury:   worse     PAIN  Pain Assessment: 0-10  Pain Score: 4  Pain Type: Chronic pain  Pain Location: Elbow  Pain Orientation: Right  Pain Radiating Towards: forearm  Pain Descriptors: Aching  Pain Frequency: Constant/continuous  Aggravating Factors: Gripping/pinching, Weight Bearing, Opening jars/containers, and Lifting/Carrying   Relieving Factors: Rest and Stretching    Relevant Information (PMH & Previous Tests/Imaging): patient reports having multiple x-rays. Patient reports having a previous history of R wrist FX with limitation following in wrist and FA.   Previous Interventions/Treatments: None     Prior Level of Function (PLOF)  Exercise/Physical Activity: Patient reports being independent with all ADL's prior to injury  Work/School: Patient is employed full-time as a .   Current ADL/IADL Status: Minimal assist is needed with resistive ADL's involving R UE.      Patients Living Environment: Reviewed and no concern; Patient lives with his wife.     Primary Language: English    Pt " "goals for therapy: \"Pain-free\" use of R UE with all ADL's.     Red Flags: Do you have any of the following? No  Fever/chills, unexplained weight changes, dizziness/fainting, unexplained change in bowel or bladder functions, unexplained malaise or muscle weakness, night pain/sweats, numbness or tingling    Objective:    Evaluation complexity=low    Right Hand AROM (degrees)   MCP PIP DIP DPC   IF  WFL WFL WFL 0 lag    MF WFL WFL WFL 0  lag   RF WFL WFL WFL 0 lag   SF WFL WFL WFL 0 lag   Thumb   WFL    Thumb RABD WFL      Thumb PABD WFL        AROM: R elbow E/F=0/142   R wrist E/F=60/35, UD/RD=25/20(no changes in AROM per patient)   R FA sup/pron=52/80(no changes in AROM per patient)       Physical Observation: no abnormalities are noted   Edema: none noted     Sensory: patient is tender to the touch with palpation at the R lateral epicondylar region.   Numbness/Tingling: none noted       Outcome Measures:  UEFI: 70/80    EDUCATION: home exercise program, plan of care, activity modifications, pain management, and injury pathology       Goals:  Active       OT Goals       Patient is independent with all home exercises.        Start:  12/28/23    Expected End:  01/12/24            Patient c/o 2/10 or less pain with use of R UE during ADL's and home exercises.        Start:  12/28/23    Expected End:  01/27/24            R UE demonstrates sufficient strength with independent completion of all ADL's and home management.        Start:  12/28/23    Expected End:  01/27/24                Plan of care was developed with input and agreement by the patient    Treatments:      Therapeutic Exercise:   15 min  Therapeutic Exercise  Therapeutic Exercise Performed: Yes  Therapeutic Exercise Activity 1: nerve gliding exercises x 5 reps with steps #1-6      Orthosis:      5 min  Patient has a counter-force band. Patient was educated on proper wear to alleviate symptoms.        Assessment: Patient is a 42 yo male  s/p R lateral " epicondylitis resulting in limited participation in pain-free ADLs and inability to perform at their prior level of function. Pt would benefit from occupational therapy to address the impairments found & listed previously in the objective section in order to return to safe and pain-free ADLs and prior level of function.       Plan:      Planned Interventions include: therapeutic exercise, therapeutic activity, self-care home management, manual therapy, therapeutic activities, gait training, neuromuscular coordination, vasopneumatic, dry needling, aquatic therapy, electric stimulation, fluidotherapy, ultrasound, kinesiotaping, orthosis fabrication, wound care  Frequency: 1 x Week  Duration: 4 Weeks    Kenya Whittington, OT

## 2024-01-03 ENCOUNTER — APPOINTMENT (OUTPATIENT)
Dept: ALLERGY | Facility: CLINIC | Age: 44
End: 2024-01-03
Payer: COMMERCIAL

## 2024-01-04 ENCOUNTER — APPOINTMENT (OUTPATIENT)
Dept: OPHTHALMOLOGY | Facility: CLINIC | Age: 44
End: 2024-01-04
Payer: COMMERCIAL

## 2024-01-10 ENCOUNTER — CLINICAL SUPPORT (OUTPATIENT)
Dept: ALLERGY | Facility: CLINIC | Age: 44
End: 2024-01-10
Payer: COMMERCIAL

## 2024-01-10 DIAGNOSIS — J30.81 ALLERGIC RHINITIS DUE TO ANIMAL (CAT) (DOG) HAIR AND DANDER: ICD-10-CM

## 2024-01-10 DIAGNOSIS — J30.89 NON-SEASONAL ALLERGIC RHINITIS, UNSPECIFIED TRIGGER: ICD-10-CM

## 2024-01-10 DIAGNOSIS — J30.1 ALLERGIC RHINITIS DUE TO POLLEN, UNSPECIFIED SEASONALITY: ICD-10-CM

## 2024-01-10 PROCEDURE — 95117 IMMUNOTHERAPY INJECTIONS: CPT | Performed by: ALLERGY & IMMUNOLOGY

## 2024-01-13 ENCOUNTER — OFFICE VISIT (OUTPATIENT)
Dept: OTOLARYNGOLOGY | Facility: CLINIC | Age: 44
End: 2024-01-13
Payer: COMMERCIAL

## 2024-01-13 VITALS — WEIGHT: 222 LBS | HEIGHT: 70 IN | BODY MASS INDEX: 31.78 KG/M2

## 2024-01-13 DIAGNOSIS — J30.89 NON-SEASONAL ALLERGIC RHINITIS, UNSPECIFIED TRIGGER: ICD-10-CM

## 2024-01-13 DIAGNOSIS — H60.543 ECZEMA OF EXTERNAL EAR, BILATERAL: Primary | ICD-10-CM

## 2024-01-13 DIAGNOSIS — G47.33 OBSTRUCTIVE SLEEP APNEA: ICD-10-CM

## 2024-01-13 DIAGNOSIS — K21.9 GERD WITHOUT ESOPHAGITIS: ICD-10-CM

## 2024-01-13 DIAGNOSIS — J34.3 HYPERTROPHY OF NASAL TURBINATES: ICD-10-CM

## 2024-01-13 DIAGNOSIS — J34.89 NASAL OBSTRUCTION: ICD-10-CM

## 2024-01-13 PROCEDURE — 99214 OFFICE O/P EST MOD 30 MIN: CPT | Performed by: OTOLARYNGOLOGY

## 2024-01-13 PROCEDURE — 1036F TOBACCO NON-USER: CPT | Performed by: OTOLARYNGOLOGY

## 2024-01-13 RX ORDER — MOMETASONE FUROATE 1 MG/G
CREAM TOPICAL DAILY PRN
Qty: 15 G | Refills: 3 | Status: SHIPPED | OUTPATIENT
Start: 2024-01-13

## 2024-01-13 NOTE — PROGRESS NOTES
Subjective   Patient ID: Wale Cali is a 43 y.o. male  HPI  Patient presents for follow-up for chronic allergic rhinitis and history of chronic sinusitis and obstructive sleep apnea and chronic nasal congestion.  He is complaining of some asthma symptoms recently.  He continues to receive allergy shots and he also continues to use Flonase and Xhance and Dupixent.  He has no purulence from his nose and no facial pain.  He continues to complain of some nasal congestion.  He is also complaining of itching in his ears bilaterally.  Review of Systems    Objective   Physical Exam  There is nasal edema and the turbinates are pale and enlarged.  There is no purulence or facial tenderness noted.  There is dry flaking of the skin in the ear canals noted consistent with eczema.  The tympanic membranes are clear and mobile.    Assessment/Plan   Diagnoses and all orders for this visit:  Eczema of external ear, bilateral (Primary)  -     mometasone (Elocon) 0.1 % cream; Apply topically once daily as needed (For itching).  Non-seasonal allergic rhinitis, unspecified trigger  Obstructive sleep apnea  Hypertrophy of nasal turbinates  Nasal obstruction     1. Chronic allergic rhinitis with history of chronic sinusitis and extensive nasal and sinus polyps status post revision bilateral endoscopic sinus surgery in March 2023.  He is currently followed by his allergist and he was advised to consult with his allergist regarding the recent asthma symptoms.  2. Chronic obstructive sleep apnea controlled with CPAP.  3. Chronic hypertrophy of the inferior turbinates with previous history of septoplasty many years ago.  He was advised to elevate the head of the bed to minimize nasal edema at night and if there is no improvement then bilateral submucous resection of the inferior turbinates will be considered.  4.  Chronic eczema of the external ear canals with partial response to DermOtic drops.  The patient was also started on  mometasone cream to be used as needed.  He will follow-up in 6 months and his prescriptions were renewed today.

## 2024-01-15 DIAGNOSIS — J30.1 ALLERGIC RHINITIS DUE TO POLLEN, UNSPECIFIED SEASONALITY: Primary | ICD-10-CM

## 2024-01-15 DIAGNOSIS — J33.8 NASAL SINUS POLYP: ICD-10-CM

## 2024-01-15 RX ORDER — PANTOPRAZOLE SODIUM 40 MG/1
TABLET, DELAYED RELEASE ORAL
Qty: 90 TABLET | Refills: 0 | Status: SHIPPED | OUTPATIENT
Start: 2024-01-15 | End: 2024-02-15 | Stop reason: SDUPTHER

## 2024-01-16 RX ORDER — MONTELUKAST SODIUM 10 MG/1
10 TABLET ORAL DAILY
Qty: 30 TABLET | Refills: 11 | Status: SHIPPED | OUTPATIENT
Start: 2024-01-16

## 2024-01-24 ENCOUNTER — APPOINTMENT (OUTPATIENT)
Dept: ALLERGY | Facility: CLINIC | Age: 44
End: 2024-01-24
Payer: COMMERCIAL

## 2024-02-13 ENCOUNTER — LAB (OUTPATIENT)
Dept: LAB | Facility: LAB | Age: 44
End: 2024-02-13
Payer: COMMERCIAL

## 2024-02-13 DIAGNOSIS — Z00.00 ANNUAL PHYSICAL EXAM: ICD-10-CM

## 2024-02-13 LAB
ALBUMIN SERPL BCP-MCNC: 4.5 G/DL (ref 3.4–5)
ALP SERPL-CCNC: 78 U/L (ref 33–120)
ALT SERPL W P-5'-P-CCNC: 33 U/L (ref 10–52)
ANION GAP SERPL CALC-SCNC: 17 MMOL/L (ref 10–20)
AST SERPL W P-5'-P-CCNC: 21 U/L (ref 9–39)
BASOPHILS # BLD AUTO: 0.03 X10*3/UL (ref 0–0.1)
BASOPHILS NFR BLD AUTO: 0.7 %
BILIRUB SERPL-MCNC: 0.7 MG/DL (ref 0–1.2)
BUN SERPL-MCNC: 21 MG/DL (ref 6–23)
CALCIUM SERPL-MCNC: 9.3 MG/DL (ref 8.6–10.3)
CHLORIDE SERPL-SCNC: 102 MMOL/L (ref 98–107)
CHOLEST SERPL-MCNC: 117 MG/DL (ref 0–199)
CHOLESTEROL/HDL RATIO: 3.4
CO2 SERPL-SCNC: 27 MMOL/L (ref 21–32)
CREAT SERPL-MCNC: 0.85 MG/DL (ref 0.5–1.3)
EGFRCR SERPLBLD CKD-EPI 2021: >90 ML/MIN/1.73M*2
EOSINOPHIL # BLD AUTO: 0.43 X10*3/UL (ref 0–0.7)
EOSINOPHIL NFR BLD AUTO: 9.8 %
ERYTHROCYTE [DISTWIDTH] IN BLOOD BY AUTOMATED COUNT: 11.8 % (ref 11.5–14.5)
EST. AVERAGE GLUCOSE BLD GHB EST-MCNC: 97 MG/DL
GLUCOSE SERPL-MCNC: 85 MG/DL (ref 74–99)
HBA1C MFR BLD: 5 %
HCT VFR BLD AUTO: 46.3 % (ref 41–52)
HDLC SERPL-MCNC: 34.1 MG/DL
HGB BLD-MCNC: 15.5 G/DL (ref 13.5–17.5)
IMM GRANULOCYTES # BLD AUTO: 0.02 X10*3/UL (ref 0–0.7)
IMM GRANULOCYTES NFR BLD AUTO: 0.5 % (ref 0–0.9)
LDLC SERPL CALC-MCNC: 58 MG/DL
LYMPHOCYTES # BLD AUTO: 1.33 X10*3/UL (ref 1.2–4.8)
LYMPHOCYTES NFR BLD AUTO: 30.4 %
MCH RBC QN AUTO: 31.8 PG (ref 26–34)
MCHC RBC AUTO-ENTMCNC: 33.5 G/DL (ref 32–36)
MCV RBC AUTO: 95 FL (ref 80–100)
MONOCYTES # BLD AUTO: 0.52 X10*3/UL (ref 0.1–1)
MONOCYTES NFR BLD AUTO: 11.9 %
NEUTROPHILS # BLD AUTO: 2.04 X10*3/UL (ref 1.2–7.7)
NEUTROPHILS NFR BLD AUTO: 46.7 %
NON HDL CHOLESTEROL: 83 MG/DL (ref 0–149)
NRBC BLD-RTO: 0 /100 WBCS (ref 0–0)
PLATELET # BLD AUTO: 178 X10*3/UL (ref 150–450)
POTASSIUM SERPL-SCNC: 4.1 MMOL/L (ref 3.5–5.3)
PROT SERPL-MCNC: 7.2 G/DL (ref 6.4–8.2)
RBC # BLD AUTO: 4.88 X10*6/UL (ref 4.5–5.9)
SODIUM SERPL-SCNC: 142 MMOL/L (ref 136–145)
TRIGL SERPL-MCNC: 125 MG/DL (ref 0–149)
TSH SERPL-ACNC: 2.04 MIU/L (ref 0.44–3.98)
VLDL: 25 MG/DL (ref 0–40)
WBC # BLD AUTO: 4.4 X10*3/UL (ref 4.4–11.3)

## 2024-02-13 PROCEDURE — 83036 HEMOGLOBIN GLYCOSYLATED A1C: CPT

## 2024-02-13 PROCEDURE — 80061 LIPID PANEL: CPT

## 2024-02-13 PROCEDURE — 36415 COLL VENOUS BLD VENIPUNCTURE: CPT

## 2024-02-13 PROCEDURE — 85025 COMPLETE CBC W/AUTO DIFF WBC: CPT

## 2024-02-13 PROCEDURE — 80053 COMPREHEN METABOLIC PANEL: CPT

## 2024-02-13 PROCEDURE — 84443 ASSAY THYROID STIM HORMONE: CPT

## 2024-02-14 ENCOUNTER — OFFICE VISIT (OUTPATIENT)
Dept: ALLERGY | Facility: CLINIC | Age: 44
End: 2024-02-14
Payer: COMMERCIAL

## 2024-02-14 VITALS
SYSTOLIC BLOOD PRESSURE: 144 MMHG | HEART RATE: 87 BPM | BODY MASS INDEX: 29.58 KG/M2 | DIASTOLIC BLOOD PRESSURE: 82 MMHG | WEIGHT: 218.4 LBS | HEIGHT: 72 IN

## 2024-02-14 DIAGNOSIS — J45.50 SEVERE PERSISTENT ASTHMA, UNSPECIFIED WHETHER COMPLICATED (MULTI): Primary | ICD-10-CM

## 2024-02-14 DIAGNOSIS — J33.8 NASAL SINUS POLYP: ICD-10-CM

## 2024-02-14 DIAGNOSIS — J30.2 SEASONAL ALLERGIC RHINITIS, UNSPECIFIED TRIGGER: ICD-10-CM

## 2024-02-14 PROCEDURE — 99214 OFFICE O/P EST MOD 30 MIN: CPT | Performed by: ALLERGY & IMMUNOLOGY

## 2024-02-14 PROCEDURE — 1036F TOBACCO NON-USER: CPT | Performed by: ALLERGY & IMMUNOLOGY

## 2024-02-14 PROCEDURE — 3077F SYST BP >= 140 MM HG: CPT | Performed by: ALLERGY & IMMUNOLOGY

## 2024-02-14 PROCEDURE — 95004 PERQ TESTS W/ALRGNC XTRCS: CPT | Performed by: ALLERGY & IMMUNOLOGY

## 2024-02-14 PROCEDURE — 3079F DIAST BP 80-89 MM HG: CPT | Performed by: ALLERGY & IMMUNOLOGY

## 2024-02-14 RX ORDER — FLUTICASONE FUROATE, UMECLIDINIUM BROMIDE AND VILANTEROL TRIFENATATE 200; 62.5; 25 UG/1; UG/1; UG/1
1 POWDER RESPIRATORY (INHALATION) DAILY
Qty: 1 EACH | Refills: 11 | Status: SHIPPED | OUTPATIENT
Start: 2024-02-14

## 2024-02-14 NOTE — PROGRESS NOTES
Subjective   Patient ID:   [id number]   Wale Cali is a 43 y.o. male who presents for Allergy Testing (RETEST).    Chief Complaint   Patient presents with    Allergy Testing     RETEST          HPI  This patient is here to evaluate for:    Malaise.  Over red  Saw PCP, got steroids, and then ryan much better for 5 days    On asa for desensitization - 325mg    Laslt nucala was December  Asthma getting worse.   Wheezing more.  Has been going on for 5 months.     He saw Dr. Mas and they are evaluating for a different surgery for his nasal passages.     Air-duo - now a generic replacement  Gets it from [employer]  Nucala  Xhance nasal - he has a years supply  Using it qAM  Astelin at bedtime - helps, burns but used to it.   Montelukast.  Asa 325mg twice a day     previously reported:    he is on air duo, montelukast, allegra    Needed alb last week.   Hard to breath due to his back soreness    nucala - feb in my office.   last shot was sent to his house.   He now self injects.   He will bring it to our office and we can administer it when he gets allergy shots.     PMHX: Had ethmoidectomy, sphenoidectomy, maxillary antrostomies, polypectomy on 3/13/23.  There were extensive polyps, upper nasal passages and sinuses.1         He has history of Samter's triad, sarcoidosis , chronic sinusitis, and asthma.   He has been receiving immunotherapy since 5/2017 and notes improvement with less sinus infections.    Sp 6 surgeries in the past 6-8 years.      Review of Systems   All other systems reviewed and are negative.        Objective     /82   Pulse 87   Ht 1.829 m (6')   Wt 99.1 kg (218 lb 6.4 oz)   BMI 29.62 kg/m²      Physical Exam  Vitals and nursing note reviewed.   Constitutional:       General: He is not in acute distress.     Appearance: Normal appearance. He is normal weight. He is not ill-appearing.   HENT:      Head: Normocephalic and atraumatic.      Right Ear: Tympanic membrane, ear  canal and external ear normal.      Left Ear: Tympanic membrane, ear canal and external ear normal.      Nose: Nose normal. No septal deviation, congestion or rhinorrhea.      Right Turbinates: Not enlarged, swollen or pale.      Left Turbinates: Not enlarged, swollen or pale.      Comments: Septum unremarkable without perforation or ulceration  No polyps seen on anterior exam     Mouth/Throat:      Mouth: Mucous membranes are moist.      Pharynx: Oropharynx is clear. No oropharyngeal exudate or posterior oropharyngeal erythema.   Eyes:      General:         Right eye: No discharge.         Left eye: No discharge.      Extraocular Movements: Extraocular movements intact.      Conjunctiva/sclera: Conjunctivae normal.      Pupils: Pupils are equal, round, and reactive to light.   Cardiovascular:      Rate and Rhythm: Normal rate and regular rhythm.      Pulses: Normal pulses.      Heart sounds: Normal heart sounds. No murmur heard.     No friction rub. No gallop.   Pulmonary:      Effort: Pulmonary effort is normal. No respiratory distress.      Breath sounds: Normal breath sounds. No stridor. No wheezing, rhonchi or rales.   Chest:      Chest wall: No tenderness.   Abdominal:      General: Abdomen is flat.      Palpations: Abdomen is soft.   Musculoskeletal:         General: Normal range of motion.      Cervical back: Normal range of motion and neck supple.   Lymphadenopathy:      Cervical: No cervical adenopathy.   Skin:     General: Skin is warm and dry.      Findings: No erythema, lesion or rash.   Neurological:      General: No focal deficit present.      Mental Status: He is alert and oriented to person, place, and time. Mental status is at baseline.   Psychiatric:         Mood and Affect: Mood normal.         Behavior: Behavior normal.         Thought Content: Thought content normal.         Judgment: Judgment normal.            Current Outpatient Medications   Medication Sig Dispense Refill    albuterol 90  mcg/actuation inhaler Inhale.      aspirin 325 mg tablet Take 1 tablet (325 mg) by mouth 2 times a day.      atorvastatin (Lipitor) 80 mg tablet TAKE ONE TABLET BY MOUTH ONCE DAILY 90 tablet 1    azelastine (Astelin) 137 mcg (0.1 %) nasal spray USE TWO SPRAYS NASALLY TWO TIMES A DAY      clotrimazole (Lotrimin) 1 % cream Apply sparingly twice daily.      cyclobenzaprine (Flexeril) 5 mg tablet Take 1 tablet (5 mg) by mouth 3 times a day as needed.      EPINEPHrine 0.3 mg/0.3 mL injection syringe USE AS DIRECTED IN CASE OF A SEVERE ALLERGIC REACTION      fluocinolone (DermOtic) 0.01 % ear drops Administer into affected ear(s).      fluticasone propion-salmeteroL (AirDuo Digihaler) 232-14 mcg/actuation inhaler Inhale 1 puff 2 times a day. 1 each 1    fluticasone propionate (Xhance) 93 mcg/actuation aerosol breath activated Administer into affected nostril(s).      lisinopril 20 mg tablet TAKE ONE TABLET BY MOUTH once DAILY 90 tablet 1    mepolizumab (Nucala) 100 mg/mL injection Inject under the skin.      mometasone (Elocon) 0.1 % cream Apply topically once daily as needed (For itching). 15 g 3    montelukast (Singulair) 10 mg tablet TAKE ONE TABLET BY MOUTH EVERY DAY 30 tablet 11    multivitamin tablet Take by mouth.      mupirocin (Bactroban) 2 % ointment Apply topically twice a day.      Nucala 100 mg/mL syringe INJECT 100MG SUBCUTANEOUSLY  EVERY 4 WEEKS 1 mL 11    pantoprazole (ProtoNix) 40 mg EC tablet TAKE ONE TABLET BY MOUTH EVERY DAY 30 MINUTES BEFORE BREAKFAST. 90 tablet 0    triamcinolone (Kenalog) 0.5 % cream twice a day.       No current facility-administered medications for this visit.       Summary of the labs over the past 6 months:    Lab on 02/13/2024   Component Date Value Ref Range Status    WBC 02/13/2024 4.4  4.4 - 11.3 x10*3/uL Final    nRBC 02/13/2024 0.0  0.0 - 0.0 /100 WBCs Final    RBC 02/13/2024 4.88  4.50 - 5.90 x10*6/uL Final    Hemoglobin 02/13/2024 15.5  13.5 - 17.5 g/dL Final     Hematocrit 02/13/2024 46.3  41.0 - 52.0 % Final    MCV 02/13/2024 95  80 - 100 fL Final    MCH 02/13/2024 31.8  26.0 - 34.0 pg Final    MCHC 02/13/2024 33.5  32.0 - 36.0 g/dL Final    RDW 02/13/2024 11.8  11.5 - 14.5 % Final    Platelets 02/13/2024 178  150 - 450 x10*3/uL Final    Neutrophils % 02/13/2024 46.7  40.0 - 80.0 % Final    Immature Granulocytes %, Automated 02/13/2024 0.5  0.0 - 0.9 % Final    Lymphocytes % 02/13/2024 30.4  13.0 - 44.0 % Final    Monocytes % 02/13/2024 11.9  2.0 - 10.0 % Final    Eosinophils % 02/13/2024 9.8  0.0 - 6.0 % Final    Basophils % 02/13/2024 0.7  0.0 - 2.0 % Final    Neutrophils Absolute 02/13/2024 2.04  1.20 - 7.70 x10*3/uL Final    Immature Granulocytes Absolute, Au* 02/13/2024 0.02  0.00 - 0.70 x10*3/uL Final    Lymphocytes Absolute 02/13/2024 1.33  1.20 - 4.80 x10*3/uL Final    Monocytes Absolute 02/13/2024 0.52  0.10 - 1.00 x10*3/uL Final    Eosinophils Absolute 02/13/2024 0.43  0.00 - 0.70 x10*3/uL Final    Basophils Absolute 02/13/2024 0.03  0.00 - 0.10 x10*3/uL Final    Glucose 02/13/2024 85  74 - 99 mg/dL Final    Sodium 02/13/2024 142  136 - 145 mmol/L Final    Potassium 02/13/2024 4.1  3.5 - 5.3 mmol/L Final    Chloride 02/13/2024 102  98 - 107 mmol/L Final    Bicarbonate 02/13/2024 27  21 - 32 mmol/L Final    Anion Gap 02/13/2024 17  10 - 20 mmol/L Final    Urea Nitrogen 02/13/2024 21  6 - 23 mg/dL Final    Creatinine 02/13/2024 0.85  0.50 - 1.30 mg/dL Final    eGFR 02/13/2024 >90  >60 mL/min/1.73m*2 Final    Calcium 02/13/2024 9.3  8.6 - 10.3 mg/dL Final    Albumin 02/13/2024 4.5  3.4 - 5.0 g/dL Final    Alkaline Phosphatase 02/13/2024 78  33 - 120 U/L Final    Total Protein 02/13/2024 7.2  6.4 - 8.2 g/dL Final    AST 02/13/2024 21  9 - 39 U/L Final    Bilirubin, Total 02/13/2024 0.7  0.0 - 1.2 mg/dL Final    ALT 02/13/2024 33  10 - 52 U/L Final    Hemoglobin A1C 02/13/2024 5.0  see below % Final    Estimated Average Glucose 02/13/2024 97  Not Established mg/dL  Final    Cholesterol 02/13/2024 117  0 - 199 mg/dL Final    HDL-Cholesterol 02/13/2024 34.1  mg/dL Final    Cholesterol/HDL Ratio 02/13/2024 3.4   Final    LDL Calculated 02/13/2024 58  <=99 mg/dL Final    VLDL 02/13/2024 25  0 - 40 mg/dL Final    Triglycerides 02/13/2024 125  0 - 149 mg/dL Final    Non HDL Cholesterol 02/13/2024 83  0 - 149 mg/dL Final    Thyroid Stimulating Hormone 02/13/2024 2.04  0.44 - 3.98 mIU/L Final         Assessment/Plan       Continue nucala for CRSwNP  This also helps your asthma    I understand that it is $50/month. Will reach out and see if we can help you out with some samples.    Change airduo to TRELEGY 200 since increased asthma sxs.    Continue the other meds per chart, as above.    This patient has completed a good course of allergy shots over 5 years.  Based on the improvement compared to their initial symptom of allergy they have had a good response to the allergy shots and maximized their efficacy. We can discontinue them at this time and manage symptoms as needed with medications.      Benjamin Hayes MD

## 2024-02-15 ENCOUNTER — OFFICE VISIT (OUTPATIENT)
Dept: PRIMARY CARE | Facility: CLINIC | Age: 44
End: 2024-02-15
Payer: COMMERCIAL

## 2024-02-15 VITALS
HEART RATE: 81 BPM | TEMPERATURE: 97.5 F | WEIGHT: 224.8 LBS | SYSTOLIC BLOOD PRESSURE: 128 MMHG | OXYGEN SATURATION: 95 % | DIASTOLIC BLOOD PRESSURE: 80 MMHG | BODY MASS INDEX: 30.49 KG/M2

## 2024-02-15 DIAGNOSIS — E78.00 PURE HYPERCHOLESTEROLEMIA: ICD-10-CM

## 2024-02-15 DIAGNOSIS — J40 BRONCHITIS: Primary | ICD-10-CM

## 2024-02-15 DIAGNOSIS — M79.609 STUMP PAIN (MULTI): ICD-10-CM

## 2024-02-15 DIAGNOSIS — I10 ESSENTIAL HYPERTENSION: ICD-10-CM

## 2024-02-15 DIAGNOSIS — S88.912S AMPUTATION OF LEFT LOWER EXTREMITY, SEQUELA (MULTI): ICD-10-CM

## 2024-02-15 DIAGNOSIS — T87.89 STUMP PAIN (MULTI): ICD-10-CM

## 2024-02-15 DIAGNOSIS — K21.9 GERD WITHOUT ESOPHAGITIS: ICD-10-CM

## 2024-02-15 DIAGNOSIS — D71 GRANULOMATOUS DISEASE (MULTI): ICD-10-CM

## 2024-02-15 DIAGNOSIS — J84.10 GRANULOMATOUS LUNG DISEASE (MULTI): ICD-10-CM

## 2024-02-15 PROCEDURE — 1036F TOBACCO NON-USER: CPT | Performed by: INTERNAL MEDICINE

## 2024-02-15 PROCEDURE — 3079F DIAST BP 80-89 MM HG: CPT | Performed by: INTERNAL MEDICINE

## 2024-02-15 PROCEDURE — 3074F SYST BP LT 130 MM HG: CPT | Performed by: INTERNAL MEDICINE

## 2024-02-15 PROCEDURE — 99214 OFFICE O/P EST MOD 30 MIN: CPT | Performed by: INTERNAL MEDICINE

## 2024-02-15 RX ORDER — MINERAL OIL
ENEMA (ML) RECTAL
COMMUNITY
Start: 2016-06-28

## 2024-02-15 RX ORDER — AZITHROMYCIN 250 MG/1
TABLET, FILM COATED ORAL
Qty: 6 TABLET | Refills: 0 | Status: SHIPPED | OUTPATIENT
Start: 2024-02-15 | End: 2024-02-20

## 2024-02-15 RX ORDER — PANTOPRAZOLE SODIUM 40 MG/1
40 TABLET, DELAYED RELEASE ORAL
Qty: 90 TABLET | Refills: 1 | Status: SHIPPED | OUTPATIENT
Start: 2024-02-15

## 2024-02-15 RX ORDER — LISINOPRIL 20 MG/1
20 TABLET ORAL DAILY
Qty: 90 TABLET | Refills: 1 | Status: SHIPPED | OUTPATIENT
Start: 2024-02-15

## 2024-02-15 RX ORDER — ATORVASTATIN CALCIUM 80 MG/1
80 TABLET, FILM COATED ORAL DAILY
Qty: 90 TABLET | Refills: 1 | Status: SHIPPED | OUTPATIENT
Start: 2024-02-15

## 2024-02-15 ASSESSMENT — ENCOUNTER SYMPTOMS
UNEXPECTED WEIGHT CHANGE: 0
DIZZINESS: 0
WHEEZING: 0
COUGH: 1
DIFFICULTY URINATING: 0
HEADACHES: 0
ABDOMINAL PAIN: 0
DIARRHEA: 0
SORE THROAT: 0
FEVER: 0
HYPERTENSION: 1
PALPITATIONS: 0
BLOOD IN STOOL: 0
FATIGUE: 0
ARTHRALGIAS: 0
BRUISES/BLEEDS EASILY: 0
SINUS PAIN: 0

## 2024-02-15 NOTE — PROGRESS NOTES
Subjective   Patient ID: Wale Cali is a 43 y.o. male who presents for Results, Hyperlipidemia, Hypertension, GERD, and Sinusitis (Congestion, cough with production x 1 week covid test negative last week).     - Patient complaining of cough wheezing COVID test negative  Start patient Z-Milind  Continue with current inhaler  - Recent blood work reviewed with patient and up-to-date controlled  -Left below knee amputation with a skin granulation tissue not controlled patient scheduled to see dermatology we will follow-up closely  --Hypertension controlled to continue with lisinopril doing well controlled, patient off amlodipine edema improved  - Spiculated lung lesion follow-up pulmonary, most likely inflammatory seen by pulmonary service recommendation CT showed granulomatous lung disease follow-up CT per pulmonary recommendation  t-Granulomatous lung disease Samter's syndrome between (nasal polyps and asthma aspirin sensitivity,) follow-up pulmonary in January as a scheduled after repeating scan  -Underlying sleep apnea follow-up otolaryngology as recommended  -Hypercholesterolemia controlled   -Vitamin D deficiency need to start on vitamin D 5000 daily  -Chronic reflux disease status post upper endoscopy symptoms are controlled only now with pantoprazole on diet-controlled  Follow-up 6 months physical exam      Hyperlipidemia  Pertinent negatives include no chest pain.   Hypertension  Pertinent negatives include no chest pain, headaches or palpitations.   GERD  He complains of coughing. He reports no abdominal pain, no chest pain, no sore throat or no wheezing. Pertinent negatives include no fatigue.   Sinusitis  Associated symptoms include coughing. Pertinent negatives include no congestion, ear pain, headaches or sore throat.          Review of Systems   Constitutional:  Negative for fatigue, fever and unexpected weight change.   HENT:  Negative for congestion, ear discharge, ear pain, mouth sores,  sinus pain and sore throat.    Eyes:  Negative for visual disturbance.   Respiratory:  Positive for cough. Negative for wheezing.    Cardiovascular:  Negative for chest pain, palpitations and leg swelling.   Gastrointestinal:  Negative for abdominal pain, blood in stool and diarrhea.   Genitourinary:  Negative for difficulty urinating.   Musculoskeletal:  Negative for arthralgias.   Skin:  Negative for rash.   Neurological:  Negative for dizziness and headaches.   Hematological:  Does not bruise/bleed easily.   Psychiatric/Behavioral:  Negative for behavioral problems.    All other systems reviewed and are negative.      Objective   Lab Results   Component Value Date    HGBA1C 5.0 02/13/2024      /80   Pulse 81   Temp 36.4 °C (97.5 °F)   Wt 102 kg (224 lb 12.8 oz)   SpO2 95%   BMI 30.49 kg/m²   Lab Results   Component Value Date    WBC 4.4 02/13/2024    HGB 15.5 02/13/2024    HCT 46.3 02/13/2024     02/13/2024    CHOL 117 02/13/2024    TRIG 125 02/13/2024    HDL 34.1 02/13/2024    ALT 33 02/13/2024    AST 21 02/13/2024     02/13/2024    K 4.1 02/13/2024     02/13/2024    CREATININE 0.85 02/13/2024    BUN 21 02/13/2024    CO2 27 02/13/2024    TSH 2.04 02/13/2024    INR 1.1 06/10/2021    HGBA1C 5.0 02/13/2024     par   Physical Exam  Vitals and nursing note reviewed.   Constitutional:       Appearance: Normal appearance.   HENT:      Head: Normocephalic.      Nose: Nose normal.   Eyes:      Conjunctiva/sclera: Conjunctivae normal.      Pupils: Pupils are equal, round, and reactive to light.   Cardiovascular:      Rate and Rhythm: Regular rhythm.   Pulmonary:      Effort: Pulmonary effort is normal.      Breath sounds: Normal breath sounds.   Abdominal:      General: Abdomen is flat.      Palpations: Abdomen is soft.   Musculoskeletal:         General: Tenderness (Tenderness below left knee amputation) present.      Cervical back: Neck supple.   Skin:     General: Skin is warm.    Neurological:      General: No focal deficit present.      Mental Status: He is oriented to person, place, and time.   Psychiatric:         Mood and Affect: Mood normal.         Assessment/Plan   Wale was seen today for results, hyperlipidemia, hypertension, gerd and sinusitis.  Diagnoses and all orders for this visit:  Bronchitis (Primary)  -     azithromycin (Zithromax) 250 mg tablet; Take 2 tablets (500 mg) by mouth once daily for 1 day, THEN 1 tablet (250 mg) once daily for 4 days. Take 2 tabs (500 mg) by mouth today, than 1 daily for 4 days..  Pure hypercholesterolemia  -     atorvastatin (Lipitor) 80 mg tablet; Take 1 tablet (80 mg) by mouth once daily.  Amputation of left lower extremity, sequela (CMS/HCC)  Stump pain (CMS/HCC)  Granulomatous lung disease (CMS/HCC)  Essential hypertension  -     lisinopril 20 mg tablet; Take 1 tablet (20 mg) by mouth once daily.  GERD without esophagitis  -     pantoprazole (ProtoNix) 40 mg EC tablet; Take 1 tablet (40 mg) by mouth once daily in the morning. Take before meals. Do not crush, chew, or split.  Amputation of left lower extremity, sequela (CMS/HCC)  Granulomatous disease (CMS/HCC)  Other orders  -     Follow Up In Primary Care - Established  -     Follow Up In Primary Care - Health Maintenance; Future   - Patient complaining of cough wheezing COVID test negative  Start patient Z-Milind  Continue with current inhaler  - Recent blood work reviewed with patient and up-to-date controlled  -Left below knee amputation with a skin granulation tissue not controlled patient scheduled to see dermatology we will follow-up closely  --Hypertension controlled to continue with lisinopril doing well controlled, patient off amlodipine edema improved  - Spiculated lung lesion follow-up pulmonary, most likely inflammatory seen by pulmonary service recommendation CT showed granulomatous lung disease follow-up CT per pulmonary recommendation  t-Granulomatous lung disease Danis  syndrome between (nasal polyps and asthma aspirin sensitivity,) follow-up pulmonary in January as a scheduled after repeating scan  -Underlying sleep apnea follow-up otolaryngology as recommended  -Hypercholesterolemia controlled   -Vitamin D deficiency need to start on vitamin D 5000 daily  -Chronic reflux disease status post upper endoscopy symptoms are controlled only now with pantoprazole on diet-controlled  Follow-up 6 months physical exam

## 2024-02-21 ENCOUNTER — OFFICE VISIT (OUTPATIENT)
Dept: DERMATOLOGY | Facility: CLINIC | Age: 44
End: 2024-02-21
Payer: COMMERCIAL

## 2024-02-21 DIAGNOSIS — L81.4 LENTIGO: ICD-10-CM

## 2024-02-21 DIAGNOSIS — L90.5 SCAR CONDITIONS AND FIBROSIS OF SKIN: ICD-10-CM

## 2024-02-21 DIAGNOSIS — D86.9 SARCOIDOSIS: ICD-10-CM

## 2024-02-21 DIAGNOSIS — D22.9 BENIGN NEVUS: Primary | ICD-10-CM

## 2024-02-21 DIAGNOSIS — L57.9 SKIN CHANGES DUE TO CHRONIC EXPOSURE TO NONIONIZING RADIATION: ICD-10-CM

## 2024-02-21 PROCEDURE — 99213 OFFICE O/P EST LOW 20 MIN: CPT | Performed by: NURSE PRACTITIONER

## 2024-02-21 PROCEDURE — 1036F TOBACCO NON-USER: CPT | Performed by: NURSE PRACTITIONER

## 2024-02-21 RX ORDER — CLOBETASOL PROPIONATE 0.5 MG/G
OINTMENT TOPICAL
Qty: 60 G | Refills: 1 | Status: SHIPPED | OUTPATIENT
Start: 2024-02-21

## 2024-02-21 NOTE — PATIENT INSTRUCTIONS

## 2024-02-21 NOTE — PROGRESS NOTES
Subjective     Wale Cali is a 43 y.o. male who presents for the following: Skin Check.     Review of Systems:  No other skin or systemic complaints other than what is documented elsewhere in the note.    The following portions of the chart were reviewed this encounter and updated as appropriate:   Tobacco  Allergies  Meds  Problems  Med Hx  Surg Hx         Skin Cancer History  No skin cancer on file.      Specialty Problems          Dermatology Problems    Skin lesion        Objective   Well appearing patient in no apparent distress; mood and affect are within normal limits.    A focused skin examination was performed waist up and legs. All findings within normal limits unless otherwise noted below.    Assessment/Plan   1. Benign nevus  Scattered, uniform and benign-appearing, regular brown melanocytic papules and macules.    1. Left mid back has a 7.5 x 4 mm oval dark brown macule with red highlights. Patchy reticular pattern throughout     The present appearance of the lesion is not worrisome but it should continue to be observed and testing/treatment may be warranted if change occurs.    Lesion being monitored is stable.     2. Lentigo  Scattered tan macules in sun-exposed areas.    A solar lentigo (plural, solar lentigines), also known as a sun-induced freckle or senile lentigo, is a dark (hyperpigmented) lesion caused by natural or artificial ultraviolet (UV) light. Solar lentigines may be single or multiple. This type of lentigo is different from a simple lentigo (lentigo simplex) because it is caused by exposure to UV light. Solar lentigines are benign, but they do indicate excessive sun exposure, a risk factor for the development of skin cancer.    To prevent solar lentigines, avoid exposure to sunlight in midday (10 AM to 3 PM), wear sun-protective clothing (tightly woven clothes and hats), and apply sunscreen (SPF 30 UVA and UVB block).    The present appearance of the lesion is not  worrisome but it should continue to be observed and testing/treatment may be warranted if change occurs.    3. Sarcoidosis (2)  Left Lower Leg - Anterior, Left Shoulder - Anterior  Maroon to dark red smooth papules with in left deltoid tattoo. Left pre tibial crest has a 1.7 x 3.7 cm maroon to dark red smooth plaque    This is a 43 y.o. male  following up for sarcoidosis. Previously used topical TAC 0.5% - helped but caused atrophy of the tissue. Trial of tacrolimus  failed to work. Will switch to clobetasol daily for 1 week then break for 1 week then repeat (1 week on and 1 week off) for next 2 months. If fails to improve at follow up will proceed with ILK given patient is experiencing pain and irritation from prosthetic sleeve.    Related Medications  clobetasol (Temovate) 0.05 % ointment  Apply to affected area daily for 1 week then break for 1 week then repeat 1 week on and 1 week off.    4. Skin changes due to chronic exposure to nonionizing radiation  Actinic changes in the form of freckles, lentigines and hyper/hypopigmentation     ABCDEs of melanoma and atypical moles were discussed with the patient.    Patient was instructed to perform monthly self skin examination.  We recommended that the patient have regular full skin exams given an increased risk of subsequent skin cancers.    The patient was instructed to use sun protective behaviors including use of broad spectrum sunscreens and sun protective clothing to reduce risk of skin cancers.    Warning signs of non-melanoma skin cancer discussed.    5. Scar conditions and fibrosis of skin  Right Ear  Well healed scar at the site(s) of prior treatment with no evidence of recurrence.          The scar is clear, there is no evidence of recurrence.  The present appearance of the scar is not worrisome but it should continue to be observed and testing/treatment may be warranted if change occurs.          Return in 2 months

## 2024-02-27 DIAGNOSIS — J84.10 GRANULOMATOUS LUNG DISEASE (MULTI): ICD-10-CM

## 2024-02-27 RX ORDER — FLUTICASONE PROPIONATE AND SALMETEROL 232; 14 UG/1; UG/1
1 POWDER, METERED RESPIRATORY (INHALATION) 2 TIMES DAILY
Qty: 1 EACH | Refills: 0 | Status: SHIPPED | OUTPATIENT
Start: 2024-02-27

## 2024-04-11 ENCOUNTER — OFFICE VISIT (OUTPATIENT)
Dept: OPHTHALMOLOGY | Facility: CLINIC | Age: 44
End: 2024-04-11
Payer: COMMERCIAL

## 2024-04-11 DIAGNOSIS — Z86.2 HISTORY OF SARCOIDOSIS: ICD-10-CM

## 2024-04-11 DIAGNOSIS — Z98.890 HX OF LASIK: ICD-10-CM

## 2024-04-11 DIAGNOSIS — H52.223 REGULAR ASTIGMATISM OF BOTH EYES: Primary | ICD-10-CM

## 2024-04-11 DIAGNOSIS — H04.123 DRY EYE SYNDROME OF BOTH EYES: ICD-10-CM

## 2024-04-11 DIAGNOSIS — H52.4 PRESBYOPIA: ICD-10-CM

## 2024-04-11 PROCEDURE — 92014 COMPRE OPH EXAM EST PT 1/>: CPT | Performed by: STUDENT IN AN ORGANIZED HEALTH CARE EDUCATION/TRAINING PROGRAM

## 2024-04-11 PROCEDURE — 92015 DETERMINE REFRACTIVE STATE: CPT | Performed by: STUDENT IN AN ORGANIZED HEALTH CARE EDUCATION/TRAINING PROGRAM

## 2024-04-11 ASSESSMENT — REFRACTION_MANIFEST
OS_CYLINDER: +0.25
OS_AXIS: 113
OD_SPHERE: -0.50
OS_CYLINDER: +0.75
METHOD_AUTOREFRACTION: 1
OS_ADD: +1.75
OS_SPHERE: -1.00
OD_AXIS: 035
OS_AXIS: 126
OD_AXIS: 015
OD_SPHERE: -0.75
OD_ADD: +1.75
OD_CYLINDER: +0.50
OD_CYLINDER: +0.75
OS_SPHERE: -0.75

## 2024-04-11 ASSESSMENT — ENCOUNTER SYMPTOMS
PSYCHIATRIC NEGATIVE: 0
ENDOCRINE NEGATIVE: 0
ALLERGIC/IMMUNOLOGIC COMMENTS: SARCOIDOSIS
CONSTITUTIONAL NEGATIVE: 0
CARDIOVASCULAR NEGATIVE: 1
ALLERGIC/IMMUNOLOGIC NEGATIVE: 1
HEMATOLOGIC/LYMPHATIC NEGATIVE: 0
EYES NEGATIVE: 0
NEUROLOGICAL NEGATIVE: 0
MUSCULOSKELETAL NEGATIVE: 0
GASTROINTESTINAL NEGATIVE: 0
RESPIRATORY NEGATIVE: 0

## 2024-04-11 ASSESSMENT — CONF VISUAL FIELD
OS_INFERIOR_TEMPORAL_RESTRICTION: 0
OD_INFERIOR_TEMPORAL_RESTRICTION: 0
OS_NORMAL: 1
OD_NORMAL: 1
OD_SUPERIOR_TEMPORAL_RESTRICTION: 0
OS_INFERIOR_NASAL_RESTRICTION: 0
OD_INFERIOR_NASAL_RESTRICTION: 0
OS_SUPERIOR_NASAL_RESTRICTION: 0
OD_SUPERIOR_NASAL_RESTRICTION: 0
OS_SUPERIOR_TEMPORAL_RESTRICTION: 0
METHOD: COUNTING FINGERS

## 2024-04-11 ASSESSMENT — VISUAL ACUITY
OD_SC: 20/20
METHOD: SNELLEN - LINEAR
OS_SC: 20/20

## 2024-04-11 ASSESSMENT — EXTERNAL EXAM - RIGHT EYE: OD_EXAM: NORMAL

## 2024-04-11 ASSESSMENT — EXTERNAL EXAM - LEFT EYE: OS_EXAM: NORMAL

## 2024-04-11 ASSESSMENT — CUP TO DISC RATIO
OD_RATIO: .45
OS_RATIO: .4

## 2024-04-11 ASSESSMENT — TONOMETRY
OD_IOP_MMHG: 18
OS_IOP_MMHG: 18
IOP_METHOD: GOLDMANN APPLANATION

## 2024-04-11 NOTE — PROGRESS NOTES
Assessment/Plan   Diagnoses and all orders for this visit:  Regular astigmatism of both eyes  Presbyopia  Hx of LASIK  New spec rx released today per patient request, optional to fill. Ocular health wnl for age OU. Monitor 1 year or sooner prn. Refraction billed today.     History of sarcoidosis  No signs of active inflammation. Monitor 1 year or sooner with new symptoms.    Dry eye syndrome of both eyes  Provided patient with artifical tear sample and coupon. Recommended use prn. RTC if symptoms worsen otherwise monitpr 1 year.    RTC 1 year for annual with BLAKE REDD

## 2024-04-16 ENCOUNTER — OFFICE VISIT (OUTPATIENT)
Dept: DERMATOLOGY | Facility: CLINIC | Age: 44
End: 2024-04-16
Payer: COMMERCIAL

## 2024-04-16 DIAGNOSIS — D86.9 SARCOIDOSIS: ICD-10-CM

## 2024-04-16 DIAGNOSIS — L72.0 EPIDERMAL CYST: Primary | ICD-10-CM

## 2024-04-16 DIAGNOSIS — R52 PAIN: ICD-10-CM

## 2024-04-16 PROCEDURE — 11900 INJECT SKIN LESIONS </W 7: CPT | Performed by: NURSE PRACTITIONER

## 2024-04-16 PROCEDURE — 1036F TOBACCO NON-USER: CPT | Performed by: NURSE PRACTITIONER

## 2024-04-16 NOTE — PROGRESS NOTES
"Subjective     Wale Cali is a 44 y.o. male who presents for the following: Sarcoidosis (Left lower leg and shoulder).     Review of Systems:  No other skin or systemic complaints other than what is documented elsewhere in the note.    The following portions of the chart were reviewed this encounter and updated as appropriate:   Tobacco  Allergies  Meds  Problems  Med Hx  Surg Hx         Skin Cancer History  No skin cancer on file.      Specialty Problems          Dermatology Problems    Skin lesion        Objective   Well appearing patient in no apparent distress; mood and affect are within normal limits.    A focused skin examination was performed . All findings within normal limits unless otherwise noted below.    Assessment/Plan   1. Epidermal cyst  Left Anterior Mandible  Flesh toned semi tender papule with punctum    Epidermoid cysts, sometimes called sebaceous cysts, contain a soft, \"cheesy\" material composed of keratin, a protein component of skin, hair, and nails. Epidermoid cysts form when the top layer of skin (the epidermis) grows into the middle layer of the skin (the dermis). This may occur due to injury or blocked hair follicles. An epidermoid cyst may have no pain or other symptoms associated with it, but if it becomes inflamed or infected, it may grow, become painful and red, and may rupture.    Noted punctum on exam today. Patient reports every few days area is draining. Patient reports he drains it himself in a few days on average and gets out pus and blood. This started about 2 months ago. Will inject with ILK today.    Discussed risks, benefits, and alternatives to intralesional kenalog injections were discussed. Kenalog injections side effects of intra-lesional kenalog injections include depressed scar, stretch marks, discoloration (lighter colored skin), pain with injection, and low risk of infection. These changes are not expected based on the dose and amount of " medication to be injected.Other treatment options include radiation, excision, cryotherapy, and laser treatments.     The patient wants to proceed with intralesional kenalog.  Pt expresses understanding of this risks and verbal consent was obtained from the patient to treat with intralesional Kenalog.  -Intralesional kenalog  10mg/ml and a total of  0.1 ml was injected to affected area(s) after prepping the skin with alcohol. Pt tolerated the procedure well with no complications. A total of 1 lesion(s) were treated.    If not resolve and still painful in a month patient in agreement with shave excision vs punch excision.       2. Sarcoidosis  Left Knee - Anterior  13 x 36 mm reddish brown thin plaque    This is a 44 y.o. male  following up for sarcoidosis. Has tried TAC, tacrolimus and more recently clobetasol daily for 1 week break for 1 week then repeat. Clobetasol has helped some. Helps while applying but during off week  becomes more painful. Currently pain is on average 3-4/10. Will proceed with injection today of left knee given continued pain experienced while wearing prosthetic.     Discussed risks, benefits, and alternatives to intralesional kenalog injections were discussed. Kenalog injections side effects of intra-lesional kenalog injections include depressed scar, stretch marks, discoloration (lighter colored skin), pain with injection, and low risk of infection. These changes are not expected based on the dose and amount of medication to be injected.Other treatment options include radiation, excision, cryotherapy, and laser treatments.     The patient wants to proceed with intralesional kenalog.  Pt expresses understanding of this risks and verbal consent was obtained from the patient to treat with intralesional Kenalog.  -Intralesional kenalog  10mg/ml and a total of  0.4ml was injected to affected area(s) after prepping the skin with alcohol. Pt tolerated the procedure well with no complications. A  total of 1 lesion(s) were treated.  .          triamcinolone acetonide (Kenalog) injection 5 mg - Left Knee - Anterior      Related Procedures  Follow Up In Dermatology - Established Patient    Related Medications  clobetasol (Temovate) 0.05 % ointment  Apply to affected area daily for 1 week then break for 1 week then repeat 1 week on and 1 week off.        Return in 6 months for routine skin check or return to clinic sooner if needed

## 2024-05-17 ENCOUNTER — OFFICE VISIT (OUTPATIENT)
Dept: DERMATOLOGY | Facility: CLINIC | Age: 44
End: 2024-05-17
Payer: COMMERCIAL

## 2024-05-17 DIAGNOSIS — D86.9 SARCOIDOSIS: ICD-10-CM

## 2024-05-17 DIAGNOSIS — R52 PAIN: ICD-10-CM

## 2024-05-17 DIAGNOSIS — D48.5 NEOPLASM OF UNCERTAIN BEHAVIOR OF SKIN: Primary | ICD-10-CM

## 2024-05-17 PROCEDURE — 1036F TOBACCO NON-USER: CPT | Performed by: NURSE PRACTITIONER

## 2024-05-17 PROCEDURE — 11900 INJECT SKIN LESIONS </W 7: CPT | Performed by: NURSE PRACTITIONER

## 2024-05-17 PROCEDURE — 11102 TANGNTL BX SKIN SINGLE LES: CPT | Performed by: NURSE PRACTITIONER

## 2024-05-17 NOTE — PROGRESS NOTES
Subjective     Wale Cali is a 44 y.o. male who presents for the following: Sarcoidosis and Suspicious Skin Lesion (Left cheek, right forearm).     Left cheek lesion continues to bleed on a semi frequent basis.     Review of Systems:  No other skin or systemic complaints other than what is documented elsewhere in the note.    The following portions of the chart were reviewed this encounter and updated as appropriate:   Tobacco  Allergies  Meds  Problems  Med Hx  Surg Hx         Skin Cancer History  No skin cancer on file.      Specialty Problems          Dermatology Problems    Skin lesion        Objective   Well appearing patient in no apparent distress; mood and affect are within normal limits.    A focused skin examination was performed. All findings within normal limits unless otherwise noted below.    Assessment/Plan   1. Neoplasm of uncertain behavior of skin  Left Buccal Cheek  4.5 x 3.5 mm tan brown pink papule          Lesion biopsy  Type of biopsy: tangential    Informed consent: discussed and consent obtained    Timeout: patient name, date of birth, surgical site, and procedure verified    Procedure prep:  Patient was prepped and draped  Anesthesia: the lesion was anesthetized in a standard fashion    Anesthetic:  1% lidocaine plain local infiltration  Instrument used: DermaBlade    Hemostasis achieved with: electrodesiccation    Outcome: patient tolerated procedure well    Post-procedure details: wound care instructions given    Additional details:  Cleaned area with isopropyl alcohol prior to anesthesia or biopsy. Applied thin layer of vaseline and covered with bandaid after procedure      Staff Communication: Dermatology Local Anesthesia: 1 % Plain Lidocaine - Amount: 0.5 ml    Specimen 1 - Dermatopathology- DERM LAB  Differential Diagnosis: Irritated dermal nevus vs EIC vs less likely NMSC    Check Margins Yes/No?:    Comments:    Dermpath Lab: Routine Histopathology  (formalin-fixed tissue)    NUB  - Given uncertainty in clinical diagnosis, shave biopsy is recommended in clinic today.  - The patient expressed understanding, is in agreement with this plan, and wishes to proceed with biopsy.  - Oral and written wound care instructions provided.  - Advised patient that the office will call within 2 weeks to discuss biopsy results.      Related Procedures  Follow Up In Dermatology - Established Patient    2. Sarcoidosis  Left Knee - Anterior  12 x 33 mm tan to reddish brown patch with a few papules noted within patch    Patient reports pain is about the same around 3-4/10. Reports no longer cracking and bleeding though. Will treat with ILK one more time since more of a patch on exam today.    Discussed risks, benefits, and alternatives to intralesional kenalog injections were discussed. Kenalog injections side effects of intra-lesional kenalog injections include depressed scar, stretch marks, discoloration (lighter colored skin), pain with injection, and low risk of infection. These changes are not expected based on the dose and amount of medication to be injected.Other treatment options include radiation, excision, cryotherapy, and laser treatments.     The patient wants to proceed with intralesional kenalog.  Pt expresses understanding of this risks and verbal consent was obtained from the patient to treat with intralesional Kenalog.  -Intralesional kenalog  10mg/ml and a total of  0.3ml was injected to affected area(s) after prepping the skin with alcohol. Pt tolerated the procedure well with no complications. A total of 1 lesion(s) were treated.      triamcinolone acetonide (Kenalog) injection 3 mg - Left Knee - Anterior      Related Procedures  Follow Up In Dermatology - Established Patient  Follow Up In Dermatology - Established Patient    Related Medications  clobetasol (Temovate) 0.05 % ointment  Apply to affected area daily for 1 week then break for 1 week then repeat 1  week on and 1 week off.    3. Pain    Related Procedures  Follow Up In Dermatology - Established Patient  Follow Up In Dermatology - Established Patient        Return in 6 months for routine skin check or return to clinic sooner if needed

## 2024-05-21 LAB
LABORATORY COMMENT REPORT: NORMAL
PATH REPORT.FINAL DX SPEC: NORMAL
PATH REPORT.GROSS SPEC: NORMAL
PATH REPORT.MICROSCOPIC SPEC OTHER STN: NORMAL
PATH REPORT.RELEVANT HX SPEC: NORMAL
PATH REPORT.TOTAL CANCER: NORMAL

## 2024-05-23 ENCOUNTER — TELEPHONE (OUTPATIENT)
Dept: DERMATOLOGY | Facility: CLINIC | Age: 44
End: 2024-05-23
Payer: COMMERCIAL

## 2024-06-14 ENCOUNTER — APPOINTMENT (OUTPATIENT)
Dept: DERMATOLOGY | Facility: CLINIC | Age: 44
End: 2024-06-14
Payer: COMMERCIAL

## 2024-06-14 DIAGNOSIS — D86.9 SARCOIDOSIS: ICD-10-CM

## 2024-06-14 DIAGNOSIS — R52 PAIN: ICD-10-CM

## 2024-06-14 PROCEDURE — 11900 INJECT SKIN LESIONS </W 7: CPT | Performed by: NURSE PRACTITIONER

## 2024-06-14 PROCEDURE — 1036F TOBACCO NON-USER: CPT | Performed by: NURSE PRACTITIONER

## 2024-06-14 NOTE — PROGRESS NOTES
Subjective     Wale Cali is a 44 y.o. male who presents for the following: Sarcoidosis.     Review of Systems:  No other skin or systemic complaints other than what is documented elsewhere in the note.    The following portions of the chart were reviewed this encounter and updated as appropriate:   Tobacco  Allergies  Meds  Problems  Med Hx  Surg Hx         Skin Cancer History  No skin cancer on file.      Specialty Problems          Dermatology Problems    Skin lesion        Objective   Well appearing patient in no apparent distress; mood and affect are within normal limits.    A focused skin examination was performed waist up. All findings within normal limits unless otherwise noted below.    Assessment/Plan   1. Sarcoidosis (2)  Left Knee - Anterior, Left Shoulder - Anterior  Collection of reddish brown papules in the middle of tattoo on the left deltoid area and a smooth brownish patch with a few reddish brown paules 1-2 mm in size to periphery of the brown patch.     Patient reports pain is better decreased from 3-4 to 2-3/10. Areas of pain noted with small papules to the knee area. He would like to proceed with ILK today to see if it can reduce pain further. Patient still to follow up with prosthetic specialist to make sure fitting correctly.     Discussed risks, benefits, and alternatives to intralesional kenalog injections were discussed. Kenalog injections side effects of intra-lesional kenalog injections include depressed scar, stretch marks, discoloration (lighter colored skin), pain with injection, and low risk of infection. These changes are not expected based on the dose and amount of medication to be injected.Other treatment options include radiation, excision, cryotherapy, and laser treatments.     The patient wants to proceed with intralesional kenalog.  Pt expresses understanding of this risks and verbal consent was obtained from the patient to treat with intralesional  Kenalog.  -Intralesional kenalog  10mg/ml and a total of  0.5ml was injected to affected area(s) after prepping the skin with alcohol. Pt tolerated the procedure well with no complications. A total of 3 lesion(s) were treated.      Related Procedures  Follow Up In Dermatology - Established Patient    Related Medications  clobetasol (Temovate) 0.05 % ointment  Apply to affected area daily for 1 week then break for 1 week then repeat 1 week on and 1 week off.    triamcinolone acetonide (Kenalog) injection 5 mg      2. Pain    Related Procedures  Follow Up In Dermatology - Established Patient        Return in 6 months for routine skin check or return to clinic sooner if needed

## 2024-07-13 ENCOUNTER — APPOINTMENT (OUTPATIENT)
Dept: OTOLARYNGOLOGY | Facility: CLINIC | Age: 44
End: 2024-07-13
Payer: COMMERCIAL

## 2024-07-13 DIAGNOSIS — H60.543 ECZEMA OF EXTERNAL EAR, BILATERAL: ICD-10-CM

## 2024-07-13 DIAGNOSIS — J34.89 NASAL OBSTRUCTION: ICD-10-CM

## 2024-07-13 DIAGNOSIS — G47.33 OBSTRUCTIVE SLEEP APNEA: ICD-10-CM

## 2024-07-13 DIAGNOSIS — H92.02 REFERRED OTALGIA OF LEFT EAR: ICD-10-CM

## 2024-07-13 DIAGNOSIS — J30.89 NON-SEASONAL ALLERGIC RHINITIS, UNSPECIFIED TRIGGER: Primary | ICD-10-CM

## 2024-07-13 DIAGNOSIS — M26.629 TMJ SYNDROME: ICD-10-CM

## 2024-07-13 DIAGNOSIS — J34.3 HYPERTROPHY OF NASAL TURBINATES: ICD-10-CM

## 2024-07-13 PROCEDURE — 99214 OFFICE O/P EST MOD 30 MIN: CPT | Performed by: OTOLARYNGOLOGY

## 2024-07-13 PROCEDURE — 1036F TOBACCO NON-USER: CPT | Performed by: OTOLARYNGOLOGY

## 2024-07-13 RX ORDER — AZELASTINE 1 MG/ML
2 SPRAY, METERED NASAL 2 TIMES DAILY
Qty: 90 ML | Refills: 0 | Status: SHIPPED | OUTPATIENT
Start: 2024-07-13

## 2024-07-13 RX ORDER — METHYLPREDNISOLONE 4 MG/1
TABLET ORAL
Qty: 21 TABLET | Refills: 0 | Status: SHIPPED | OUTPATIENT
Start: 2024-07-13

## 2024-07-13 NOTE — PROGRESS NOTES
Subjective   Patient ID: Wale Cali is a 44 y.o. male  HPI  Patient presents for follow-up for chronic allergic rhinitis and history of chronic sinusitis and obstructive sleep apnea and chronic nasal congestion.  He is complaining of some asthma symptoms recently.  He continues to receive allergy shots and he also continues to use Flonase and Allegra and Nucala he has no purulence from his nose and no facial pain.  He is complaining of chronic left-sided otalgia that has been worsening lately.  Review of Systems    Objective   Physical Exam  The following elements of a brief ear nose and throat exam were performed: External ear canals and tympanic membranes, external nose and nasal passages, oral cavity, palpation of the neck, percussion of the face, palpation of the thyroid.    There is nasal edema and the turbinates are pale and enlarged.  There is no purulence or facial tenderness noted.  There is dry flaking of the skin in the ear canals noted consistent with eczema.  The tympanic membranes are clear and mobile.  There is close of tenderness with palpation of the tympanomandibular joint on the left side and there is evidence of bruxism noted on the lower teeth.  The remainder of his exam was within normal limits.    Assessment/Plan   Diagnoses and all orders for this visit:  Non-seasonal allergic rhinitis, unspecified trigger (Primary)  -     azelastine (Astelin) 137 mcg (0.1 %) nasal spray; Administer 2 sprays into each nostril 2 times a day.  Obstructive sleep apnea  Hypertrophy of nasal turbinates  Nasal obstruction  Eczema of external ear, bilateral  Referred otalgia of left ear  TMJ syndrome  -     methylPREDNISolone (Medrol Dospak) 4 mg tablets; Follow schedule on package instructions     1. Chronic allergic rhinitis with history of chronic sinusitis and extensive nasal and sinus polyps status post revision bilateral endoscopic sinus surgery in March 2023.  He is currently followed by his  allergist.  He was advised to continue the Astelin and Allegra and Nucala.  2. Chronic obstructive sleep apnea controlled with CPAP.  3. Chronic hypertrophy of the inferior turbinates with previous history of septoplasty many years ago improved with the management of the allergies.  4.  Chronic eczema of the external ear canals controlled with DermOtic and mometasone cream.  5.  Chronic left referred otalgia and TMJ syndrome.  The patient was started on a Medrol pack and TMJ precautions and he was referred to his dentist for bite guard.  He will follow-up in 6 weeks.

## 2024-07-19 ENCOUNTER — APPOINTMENT (OUTPATIENT)
Dept: DERMATOLOGY | Facility: CLINIC | Age: 44
End: 2024-07-19
Payer: COMMERCIAL

## 2024-07-19 DIAGNOSIS — D86.9 SARCOIDOSIS: Primary | ICD-10-CM

## 2024-07-19 DIAGNOSIS — L82.1 SEBORRHEIC KERATOSIS: ICD-10-CM

## 2024-07-19 PROCEDURE — 1036F TOBACCO NON-USER: CPT | Performed by: NURSE PRACTITIONER

## 2024-07-19 PROCEDURE — 99212 OFFICE O/P EST SF 10 MIN: CPT | Performed by: NURSE PRACTITIONER

## 2024-07-19 PROCEDURE — 11900 INJECT SKIN LESIONS </W 7: CPT | Performed by: NURSE PRACTITIONER

## 2024-07-19 NOTE — PROGRESS NOTES
Subjective     Wale Cali is a 44 y.o. male who presents for the following: Sarcoidosis.     Review of Systems:  No other skin or systemic complaints other than what is documented elsewhere in the note.    The following portions of the chart were reviewed this encounter and updated as appropriate:   Tobacco  Allergies  Meds  Problems  Med Hx  Surg Hx         Skin Cancer History  No skin cancer on file.      Specialty Problems          Dermatology Problems    Skin lesion        Objective   Well appearing patient in no apparent distress; mood and affect are within normal limits.    A focused skin examination was performed. All findings within normal limits unless otherwise noted below.    Assessment/Plan   1. Sarcoidosis  Left Knee - Anterior  11 x 30 mm tan to reddish brown soft patch on the left knee. There is a reddish brown papule superior to this area. Collection of reddish brown papules to the left deltoid tattoo area are softer and thinner.     Patient reports pain to the knee area has resolved. Left deltoid is less itchy but still present. Will treat new papule to the left knee and left deltoid area with ILK, return to clinic in 1 month.      Discussed risks, benefits, and alternatives to intralesional kenalog injections were discussed. Kenalog injections side effects of intra-lesional kenalog injections include depressed scar, stretch marks, discoloration (lighter colored skin), pain with injection, and low risk of infection. These changes are not expected based on the dose and amount of medication to be injected.Other treatment options include radiation, excision, cryotherapy, and laser treatments.     The patient wants to proceed with intralesional kenalog.  Pt expresses understanding of this risks and verbal consent was obtained from the patient to treat with intralesional Kenalog.  -Intralesional kenalog  10mg/ml and a total of  0.5ml was injected to affected area(s) after prepping  the skin with alcohol. Pt tolerated the procedure well with no complications. A total of 2 lesion(s) were treated.      triamcinolone acetonide (Kenalog) injection 5 mg - Left Knee - Anterior      Related Medications  clobetasol (Temovate) 0.05 % ointment  Apply to affected area daily for 1 week then break for 1 week then repeat 1 week on and 1 week off.    2. Seborrheic keratosis (2)  Left Hand - Posterior, Right Hand - Posterior  Stuck on verrucous, tan-brown papules and plaques.      Seborrheic keratoses are common noncancerous (benign) growths of unknown cause seen in adults due to a thickening of an area of the top skin layer. Seborrheic keratoses may appear as if they are stuck on to the skin. They have distinct borders, and they may appear as papules (small, solid bumps) or plaques (solid, raised patches that are bigger than a thumbnail). They may be the same color as your skin, or they may be pink, light brown, darker brown, or very dark brown, or sometimes may appear black.    There is no way to prevent new seborrheic keratoses from forming. Seborrheic keratoses can be removed, but removal is considered a cosmetic issue and is usually not covered by insurance.    PLAN  No treatment is needed unless there is irritation from clothing, such as itching or bleeding.  2.   Some lotions containing alpha hydroxy acids, salicylic acid, or urea may make the areas feel smoother with regular use but will not eliminate them.        Return in 4 weeks

## 2024-07-31 ENCOUNTER — TELEPHONE (OUTPATIENT)
Dept: OTOLARYNGOLOGY | Facility: CLINIC | Age: 44
End: 2024-07-31
Payer: COMMERCIAL

## 2024-08-14 ENCOUNTER — APPOINTMENT (OUTPATIENT)
Dept: ALLERGY | Facility: CLINIC | Age: 44
End: 2024-08-14
Payer: COMMERCIAL

## 2024-08-14 DIAGNOSIS — J45.50 SEVERE PERSISTENT ASTHMA, UNSPECIFIED WHETHER COMPLICATED (MULTI): ICD-10-CM

## 2024-08-14 DIAGNOSIS — J30.1 ALLERGIC RHINITIS DUE TO POLLEN, UNSPECIFIED SEASONALITY: ICD-10-CM

## 2024-08-14 DIAGNOSIS — L20.89 OTHER ATOPIC DERMATITIS: ICD-10-CM

## 2024-08-14 DIAGNOSIS — J33.8 NASAL SINUS POLYP: Primary | ICD-10-CM

## 2024-08-14 PROCEDURE — 99215 OFFICE O/P EST HI 40 MIN: CPT | Performed by: ALLERGY & IMMUNOLOGY

## 2024-08-14 RX ORDER — MONTELUKAST SODIUM 10 MG/1
10 TABLET ORAL DAILY
Qty: 30 TABLET | Refills: 11 | Status: SHIPPED | OUTPATIENT
Start: 2024-08-14

## 2024-08-14 RX ORDER — MOMETASONE FUROATE 1 MG/G
OINTMENT TOPICAL 2 TIMES DAILY
Qty: 45 G | Refills: 5 | Status: SHIPPED | OUTPATIENT
Start: 2024-08-14

## 2024-08-14 RX ORDER — PREDNISONE 10 MG/1
TABLET ORAL
Qty: 20 TABLET | Refills: 0 | Status: SHIPPED | OUTPATIENT
Start: 2024-08-14

## 2024-08-14 RX ORDER — FLUTICASONE FUROATE, UMECLIDINIUM BROMIDE AND VILANTEROL TRIFENATATE 200; 62.5; 25 UG/1; UG/1; UG/1
1 POWDER RESPIRATORY (INHALATION) DAILY
Qty: 1 EACH | Refills: 11 | Status: SHIPPED | OUTPATIENT
Start: 2024-08-14

## 2024-08-14 NOTE — PROGRESS NOTES
Subjective   Patient ID:   [id number]   Wale Cali is a 44 y.o. male who presents for Allergies and Asthma.    Chief Complaint   Patient presents with    Allergies    Asthma          HPI  This patient is here to evaluate for:  Asthma, CRSwNP, and aspirin allergy s/p asa desensitization on 325mg daily    On Xhance qam, montelukast, azelastine nasal at bedtime, aspirin, Nucala (we prescribe this and pt is self injection), generic air duo had been changed to Trelegy 200 with success, and albuterol     On Nucala for 1.5 years. Breathing ok but still restricted on left side.  Ear issues, very itchy and popping 40x/day. 6 months of this issue. He denies any jaw discomfort.   Alternates from left to right ears.       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 On Nucala, astelin and allegra    Obstructive Sleep Apnea on cpap    Eczema of ear canals- on dermOtic and mometasone cream    Otalgia and TMJ    previously reported:     On asa for desensitization - 325mg     Laslt nucala was December  Asthma getting worse.   Wheezing more.  Has been going on for 5 months.      He saw Dr. Mas and they are evaluating for a different surgery for his nasal passages.      Air-duo - now a generic replacement  Gets it from [facility]  Nucala  Xhance nasal - he has a years supply  Using it qAM  Astelin at bedtime - helps, burns but used to it.   Montelukast.  Asa 325mg twice a day      previously reported:     he is on air duo, montelukast, allegra     Needed alb last week.   Hard to breath due to his back soreness     nucala - feb in my office.   last shot was sent to his house.   He now self injects.   He will bring it to our office and we can administer it when he gets allergy shots.      PMHX: Had ethmoidectomy, sphenoidectomy, maxillary antrostomies, polypectomy on 3/13/23.  There were extensive polyps, upper nasal passages and  sinuses.1           He has history of Samter's triad, sarcoidosis , chronic sinusitis, and asthma.   He has been receiving immunotherapy since 5/2017 and notes improvement with less sinus infections.     Sp 6 surgeries in the past 6-8 years.      Review of Systems      Objective     There were no vitals taken for this visit.     Physical Exam       Current Outpatient Medications   Medication Sig Dispense Refill    albuterol 90 mcg/actuation inhaler Inhale.      aspirin 325 mg tablet Take 1 tablet (325 mg) by mouth 2 times a day.      atorvastatin (Lipitor) 80 mg tablet Take 1 tablet (80 mg) by mouth once daily. 90 tablet 1    azelastine (Astelin) 137 mcg (0.1 %) nasal spray USE TWO SPRAYS NASALLY TWO TIMES A DAY      azelastine (Astelin) 137 mcg (0.1 %) nasal spray Administer 2 sprays into each nostril 2 times a day. 90 mL 0    clobetasol (Temovate) 0.05 % ointment Apply to affected area daily for 1 week then break for 1 week then repeat 1 week on and 1 week off. 60 g 1    clotrimazole (Lotrimin) 1 % cream Apply sparingly twice daily.      cyclobenzaprine (Flexeril) 5 mg tablet Take 1 tablet (5 mg) by mouth 3 times a day as needed.      EPINEPHrine 0.3 mg/0.3 mL injection syringe USE AS DIRECTED IN CASE OF A SEVERE ALLERGIC REACTION      fexofenadine (Allegra) 180 mg tablet [id number] Medication fexofenadine 180 mg tablet Allegra Allergy 180 mg 6/28/2016 Active (Outside)      fluocinolone (DermOtic) 0.01 % ear drops Administer into affected ear(s).      fluticasone propion-salmeteroL (AirDuo RespiClick) 232-14 mcg/actuation inhaler INHALE 1 PUFF TWICE DAILY 1 each 0    fluticasone propionate (Xhance) 93 mcg/actuation aerosol breath activated Administer into affected nostril(s).      fluticasone-umeclidin-vilanter (Trelegy Ellipta) 200-62.5-25 mcg blister with device Inhale 1 puff once daily. 1 each 11    lisinopril 20 mg tablet Take 1 tablet (20 mg) by mouth once daily. 90 tablet 1    mepolizumab (Nucala) 100 mg/mL  injection Inject 1 mL (100 mg) under the skin every 28 (twenty-eight) days. 1 each 11    methylPREDNISolone (Medrol Dospak) 4 mg tablets Follow schedule on package instructions 21 tablet 0    mometasone (Elocon) 0.1 % cream Apply topically once daily as needed (For itching). 15 g 3    montelukast (Singulair) 10 mg tablet TAKE ONE TABLET BY MOUTH EVERY DAY 30 tablet 11    multivitamin tablet Take by mouth.      mupirocin (Bactroban) 2 % ointment Apply topically twice a day.      Nucala 100 mg/mL syringe INJECT 100MG SUBCUTANEOUSLY  EVERY 4 WEEKS 1 mL 11    pantoprazole (ProtoNix) 40 mg EC tablet Take 1 tablet (40 mg) by mouth once daily in the morning. Take before meals. Do not crush, chew, or split. 90 tablet 1    triamcinolone (Kenalog) 0.5 % cream twice a day.       No current facility-administered medications for this visit.       Summary of the labs over the past 6 months:    Office Visit on 05/17/2024   Component Date Value Ref Range Status    Case Report 05/17/2024    Final                    Value:Dermatopathology                                  Case: [id number]                                   Authorizing Provider:  EDUARDA Montanez-CNP Collected:           05/17/2024 0805              Ordering Location:     [facility]   Received:            05/17/2024 1505              Pathologist:           Elijah Jaimes MD                                                             Specimen:    SKIN, Left Buccal Cheek                                                                    FINAL DIAGNOSIS 05/17/2024    Final                    Value:SKIN, LEFT BUCCAL CHEEK, SHAVE BIOPSY:                          DERMAL NEVUS, PRESENT ON THE DEEP AND PERIPHERAL MARGIN.                                                    ** Electronically signed out by Elijah Jaimes MD **                                05/17/2024    Final                    Value:By the signature on this report, the individual or group listed as  making                           the Final Interpretation/Diagnosis certifies that they have reviewed this                           case.     Clinical History 05/17/2024    Final                    Value:Encounter Diagnosis: Neoplasm of uncertain behavior of skin                                                                               [id number]                          Collection Comments: Differential Diagnosis: Irritated dermal nevus vs EIC                           vs less likely NMSC                            Check Margins Yes/No?:                            Comments:                            Dermpath Lab: Routine Histopathology (formalin-fixed tissue)                          Finding Region: Left Buccal Cheek                          Specimen Objective: 4.5 x 3.5 mm tan brown pink papule    Microscopic Description 05/17/2024    Final                    Value:Microscopic analysis shows a symmetric, papular proliferation of bland                           melanocytes in dermis.  Dermal melanocytes mature with increasing depth in                           dermis and show no cytologic atypia.                                                        Gross Description 05/17/2024    Final                    Value:A:                          Received in formalin is a 6 x 6 x 3 mm piece of skin.  It is tan and brown                           in color.  It is shave in shape.  It was embedded in toto .  The specimen                           was inked.                                                      The specimen was grossed by Diann De Paz.          Assessment/Plan       I would continue the aspirin 325mg to help with the underlying mechanism of Samter's triad; the nucala is helping with the eosinophilic component of the CRS and asthma so they are working together.     Use the mometasone ointment daily for 5 days, stop for 5 days, repeat. As per Dr. Mas' recs.    For the eustachian tube and sinuses,  "congestion, and lack of smell  We can use prednisone for 7 days to \"jump start things\" (smell returns once on this).  Increase xhance to twice a day     He has not had full relief from the Nucala despite 1.5 years course.   He will call in 1-2 months; if not better, will switch nucala to dupixent to try for better efficacy.  First, will try the above approach.           Benjamin Hayes MD         Time was spent: 40 minutes - Preparing to see the patient, obtaining and/or reviewing separately obtained history, performing a medically necessary appropriate physical examination and/or evaluation, counseling and educating the patient/family, ordering medications, tests or procedures, referring and communicating with other providers, documenting clinical information in the medical record, independently interpreting results and communicating results to the patient/family, and care coordination.       "

## 2024-08-15 ENCOUNTER — HOSPITAL ENCOUNTER (OUTPATIENT)
Dept: RADIOLOGY | Facility: HOSPITAL | Age: 44
Discharge: HOME | End: 2024-08-15
Payer: COMMERCIAL

## 2024-08-15 ENCOUNTER — APPOINTMENT (OUTPATIENT)
Dept: PRIMARY CARE | Facility: CLINIC | Age: 44
End: 2024-08-15
Payer: COMMERCIAL

## 2024-08-15 VITALS
SYSTOLIC BLOOD PRESSURE: 134 MMHG | WEIGHT: 226.5 LBS | TEMPERATURE: 97.1 F | BODY MASS INDEX: 30.72 KG/M2 | DIASTOLIC BLOOD PRESSURE: 72 MMHG | OXYGEN SATURATION: 95 % | HEART RATE: 75 BPM

## 2024-08-15 DIAGNOSIS — K21.9 GERD WITHOUT ESOPHAGITIS: ICD-10-CM

## 2024-08-15 DIAGNOSIS — E55.9 VITAMIN D DEFICIENCY: ICD-10-CM

## 2024-08-15 DIAGNOSIS — Z00.00 ANNUAL PHYSICAL EXAM: ICD-10-CM

## 2024-08-15 DIAGNOSIS — E78.00 PURE HYPERCHOLESTEROLEMIA: Primary | ICD-10-CM

## 2024-08-15 DIAGNOSIS — I10 ESSENTIAL HYPERTENSION: ICD-10-CM

## 2024-08-15 DIAGNOSIS — G47.30 SLEEP APNEA, UNSPECIFIED TYPE: ICD-10-CM

## 2024-08-15 DIAGNOSIS — D86.9 SARCOIDOSIS, UNSPECIFIED: ICD-10-CM

## 2024-08-15 DIAGNOSIS — J84.10 GRANULOMATOUS LUNG DISEASE (MULTI): ICD-10-CM

## 2024-08-15 PROCEDURE — 71250 CT THORAX DX C-: CPT

## 2024-08-15 PROCEDURE — 1036F TOBACCO NON-USER: CPT | Performed by: INTERNAL MEDICINE

## 2024-08-15 PROCEDURE — 3075F SYST BP GE 130 - 139MM HG: CPT | Performed by: INTERNAL MEDICINE

## 2024-08-15 PROCEDURE — 3078F DIAST BP <80 MM HG: CPT | Performed by: INTERNAL MEDICINE

## 2024-08-15 PROCEDURE — 99214 OFFICE O/P EST MOD 30 MIN: CPT | Performed by: INTERNAL MEDICINE

## 2024-08-15 RX ORDER — ATORVASTATIN CALCIUM 80 MG/1
80 TABLET, FILM COATED ORAL DAILY
Qty: 90 TABLET | Refills: 1 | Status: SHIPPED | OUTPATIENT
Start: 2024-08-15

## 2024-08-15 RX ORDER — LISINOPRIL 20 MG/1
20 TABLET ORAL DAILY
Qty: 90 TABLET | Refills: 1 | Status: SHIPPED | OUTPATIENT
Start: 2024-08-15

## 2024-08-15 RX ORDER — LISINOPRIL 20 MG/1
20 TABLET ORAL DAILY
Qty: 90 TABLET | Refills: 1 | Status: SHIPPED | OUTPATIENT
Start: 2024-08-15 | End: 2024-08-15 | Stop reason: SDUPTHER

## 2024-08-15 RX ORDER — ATORVASTATIN CALCIUM 80 MG/1
80 TABLET, FILM COATED ORAL DAILY
Qty: 90 TABLET | Refills: 1 | Status: SHIPPED | OUTPATIENT
Start: 2024-08-15 | End: 2024-08-15 | Stop reason: SDUPTHER

## 2024-08-15 RX ORDER — PANTOPRAZOLE SODIUM 40 MG/1
40 TABLET, DELAYED RELEASE ORAL
Qty: 90 TABLET | Refills: 1 | Status: SHIPPED | OUTPATIENT
Start: 2024-08-15

## 2024-08-15 ASSESSMENT — ENCOUNTER SYMPTOMS
SINUS PAIN: 0
DIZZINESS: 0
DIFFICULTY URINATING: 0
BLOOD IN STOOL: 0
WHEEZING: 0
ARTHRALGIAS: 0
SORE THROAT: 0
FATIGUE: 0
PALPITATIONS: 0
HEADACHES: 0
FEVER: 0
DIARRHEA: 0
BRUISES/BLEEDS EASILY: 0
COUGH: 0
UNEXPECTED WEIGHT CHANGE: 0
ABDOMINAL PAIN: 0

## 2024-08-15 NOTE — PROGRESS NOTES
Subjective   Patient ID: Wale Cali is a 44 y.o. male who presents for Follow-up (6 mth).    - -Left below knee amputation with a skin granulation tissue much better improved  --Hypertension controlled to continue with lisinopril doing well controlled, patient off amlodipine edema improved, no recurrence continue low-salt diet  - Spiculated lung lesion follow-up pulmonary, most likely inflammatory seen by pulmonary service recommendation CT showed granulomatous lung disease follow-up CT per pulmonary recommendation  Compliance-Granulomatous lung disease Samter's syndrome between (nasal polyps and asthma aspirin sensitivity,) follow-up pulmonary in January as a scheduled after repeating scan  -Underlying sleep apnea follow-up otolaryngology as recommended  -Hypercholesterolemia controlled   -Vitamin D deficiency need to start on vitamin D 5000 daily  -Chronic reflux disease status post upper endoscopy symptoms are controlled only now with pantoprazole on diet-controlled  Follow-up 6 months physical exam complete blood work before next appointment             Review of Systems   Constitutional:  Negative for fatigue, fever and unexpected weight change.   HENT:  Negative for congestion, ear discharge, ear pain, mouth sores, sinus pain and sore throat.    Eyes:  Negative for visual disturbance.   Respiratory:  Negative for cough and wheezing.    Cardiovascular:  Negative for chest pain, palpitations and leg swelling.   Gastrointestinal:  Negative for abdominal pain, blood in stool and diarrhea.   Genitourinary:  Negative for difficulty urinating.   Musculoskeletal:  Negative for arthralgias.   Skin:  Negative for rash.   Neurological:  Negative for dizziness and headaches.   Hematological:  Does not bruise/bleed easily.   Psychiatric/Behavioral:  Negative for behavioral problems.    All other systems reviewed and are negative.      Objective   Lab Results   Component Value Date    HGBA1C 5.0 02/13/2024       /72   Pulse 75   Temp 36.2 °C (97.1 °F)   Wt 103 kg (226 lb 8 oz)   SpO2 95%   BMI 30.72 kg/m²   Lab Results   Component Value Date    WBC 4.4 02/13/2024    HGB 15.5 02/13/2024    HCT 46.3 02/13/2024     02/13/2024    CHOL 117 02/13/2024    TRIG 125 02/13/2024    HDL 34.1 02/13/2024    ALT 33 02/13/2024    AST 21 02/13/2024     02/13/2024    K 4.1 02/13/2024     02/13/2024    CREATININE 0.85 02/13/2024    BUN 21 02/13/2024    CO2 27 02/13/2024    TSH 2.04 02/13/2024    INR 1.1 06/10/2021    HGBA1C 5.0 02/13/2024     par   Physical Exam  Vitals and nursing note reviewed.   Constitutional:       Appearance: Normal appearance.   HENT:      Head: Normocephalic.      Nose: Nose normal.   Eyes:      Conjunctiva/sclera: Conjunctivae normal.      Pupils: Pupils are equal, round, and reactive to light.   Cardiovascular:      Rate and Rhythm: Regular rhythm.   Pulmonary:      Effort: Pulmonary effort is normal.      Breath sounds: Normal breath sounds.   Abdominal:      General: Abdomen is flat.      Palpations: Abdomen is soft.   Musculoskeletal:      Cervical back: Neck supple.      Comments: Below-knee amputation   Skin:     General: Skin is warm.   Neurological:      General: No focal deficit present.      Mental Status: He is oriented to person, place, and time.   Psychiatric:         Mood and Affect: Mood normal.         Assessment/Plan   Wale was seen today for follow-up.  Diagnoses and all orders for this visit:  Pure hypercholesterolemia (Primary)  -     Discontinue: atorvastatin (Lipitor) 80 mg tablet; Take 1 tablet (80 mg) by mouth once daily.  -     atorvastatin (Lipitor) 80 mg tablet; Take 1 tablet (80 mg) by mouth once daily.  Essential hypertension  -     Discontinue: lisinopril 20 mg tablet; Take 1 tablet (20 mg) by mouth once daily.  -     lisinopril 20 mg tablet; Take 1 tablet (20 mg) by mouth once daily.  GERD without esophagitis  -     pantoprazole (ProtoNix) 40 mg EC  tablet; Take 1 tablet (40 mg) by mouth once daily in the morning. Take before meals. Do not crush, chew, or split.  Annual physical exam  -     CBC and Auto Differential; Future  -     Comprehensive Metabolic Panel; Future  -     Lipid Panel; Future  -     Magnesium; Future  -     TSH with reflex to Free T4 if abnormal; Future  -     Vitamin D 25-Hydroxy,Total (for eval of Vitamin D levels); Future  -     Hemoglobin A1C; Future  Granulomatous lung disease (Multi)  Vitamin D deficiency  Sleep apnea, unspecified type  Other orders  -     Follow Up In Primary Care - Health Maintenance  -     Follow Up In Primary Care - Health Maintenance; Future   - -Left below knee amputation with a skin granulation tissue much better improved  --Hypertension controlled to continue with lisinopril doing well controlled, patient off amlodipine edema improved, no recurrence continue low-salt diet  - Spiculated lung lesion follow-up pulmonary, most likely inflammatory seen by pulmonary service recommendation CT showed granulomatous lung disease follow-up CT per pulmonary recommendation  Compliance-Granulomatous lung disease Samter's syndrome between (nasal polyps and asthma aspirin sensitivity,) follow-up pulmonary in January as a scheduled after repeating scan  -Underlying sleep apnea follow-up otolaryngology as recommended  -Hypercholesterolemia controlled   -Vitamin D deficiency need to start on vitamin D 5000 daily  -Chronic reflux disease status post upper endoscopy symptoms are controlled only now with pantoprazole on diet-controlled  Follow-up 6 months physical exam complete blood work before next appointment

## 2024-08-20 ENCOUNTER — APPOINTMENT (OUTPATIENT)
Dept: PULMONOLOGY | Facility: CLINIC | Age: 44
End: 2024-08-20
Payer: COMMERCIAL

## 2024-08-20 DIAGNOSIS — J33.9 SAMTER'S TRIAD (HHS-HCC): ICD-10-CM

## 2024-08-20 DIAGNOSIS — Z88.6 SAMTER'S TRIAD (HHS-HCC): ICD-10-CM

## 2024-08-20 DIAGNOSIS — J45.909 ASTHMA, UNSPECIFIED ASTHMA SEVERITY, UNSPECIFIED WHETHER COMPLICATED, UNSPECIFIED WHETHER PERSISTENT (HHS-HCC): ICD-10-CM

## 2024-08-20 DIAGNOSIS — J45.909 SAMTER'S TRIAD (HHS-HCC): ICD-10-CM

## 2024-08-20 DIAGNOSIS — R91.8 LUNG NODULES: ICD-10-CM

## 2024-08-20 DIAGNOSIS — D86.9 SARCOIDOSIS: ICD-10-CM

## 2024-08-20 DIAGNOSIS — J30.9 ALLERGIC RHINITIS, UNSPECIFIED SEASONALITY, UNSPECIFIED TRIGGER: Primary | ICD-10-CM

## 2024-08-20 PROCEDURE — 99215 OFFICE O/P EST HI 40 MIN: CPT | Performed by: PEDIATRICS

## 2024-08-20 PROCEDURE — 1036F TOBACCO NON-USER: CPT | Performed by: PEDIATRICS

## 2024-08-20 ASSESSMENT — PATIENT HEALTH QUESTIONNAIRE - PHQ9
SUM OF ALL RESPONSES TO PHQ9 QUESTIONS 1 AND 2: 0
1. LITTLE INTEREST OR PLEASURE IN DOING THINGS: NOT AT ALL
2. FEELING DOWN, DEPRESSED OR HOPELESS: NOT AT ALL

## 2024-08-20 NOTE — PROGRESS NOTES
Subjective   Patient ID: Wale Cali is a 44 y.o. male who presents for asthma, sarcoidosis, Sampters triad    HPI    8/20/2024:  Mr Cali is doing well, he has had some increase in allergic symptoms and his ears are bothering him (he is seening ENT soon).  He continues with trelegy and nucala with albuterol as needed.  He had a repeat CT scan that is stable.      Previous note 8/28/2023:  Mr Cali was doing well until about 4-5 days ago when he developed increased shortness of breath, chest tightness and cough. His insurance quit covering airduo so he is only using albuterol.   He had a repeat CT which shows stable sarcoidosis and a new 3mm nodule.         previous note 1/3/2023: Mr Candelaria is doing well, he is using airduo, singulair and albuterol. He had a repeat CT after taking prednisone and a zpack the new nodule is now smaller thus likely inflammatory        previous note 9/27/2022: Mr Mohan is having more issues with sinuses, He is due to se ENT soon. He had a repeat CT and there is a new 1.5cm wedge shaped density that looks inflammatory to me. He is using airduo with albuterol the airduo does not seem to help as much as it used to        previous note 3/22/2022: Mr Cali is doing well His left sinuses are acting up again but otherwise he is doing well.. He is using airduo and needs albuterol 2-3 times a week.        previous note 9/23/2021: Mr Cali is doing well. no complaints. He feels at baseline. HE had a CT done 9/13/2021 which show stable nodules and mediastinal lymphadenopathy. He also had a PFT done today which shows borderline small airways obstruction that has a bronchodilator response.         previous note 6/22/2021: Mr Cali is a 41yr old with known sarcoid and Sampters triad that recently had another polypectomy surgery. Here to establish care. His breathing is at baseline today. The majority of his breathing issues revolve around his sinuses. He had  cutaneous sarcoid but that has not returned. He had a CT scan done in 2017 which shows multiple lung nodules and some hilar adenopathy that had been stable from previous imaging. He had a PFT in 2019 which was normal.     He uses dulera and albuterol prn he is also on flonase astelin and singulair     He never smoked     Review of Systems    See scanned documents attached to this note for review of systems, and appropriate scales/scores for this visit.     Objective   Physical Exam  Constitutional:       Appearance: Normal appearance.   HENT:      Head: Normocephalic and atraumatic.      Mouth/Throat:      Pharynx: Oropharynx is clear.   Cardiovascular:      Rate and Rhythm: Normal rate and regular rhythm.      Pulses: Normal pulses.      Heart sounds: Normal heart sounds.   Pulmonary:      Effort: Pulmonary effort is normal.      Breath sounds: Normal breath sounds. No wheezing, rhonchi or rales.   Abdominal:      General: Bowel sounds are normal.      Palpations: Abdomen is soft.   Musculoskeletal:         General: Normal range of motion.   Skin:     General: Skin is warm and dry.   Neurological:      General: No focal deficit present.      Mental Status: He is alert and oriented to person, place, and time.   Psychiatric:         Mood and Affect: Mood normal.       Assessment/Plan     1. Moderate persistent asthma: acute exacerbation  - instructed to call insurance to see what maintenance inhaler would be covered (suggested advair, breo, dulera, symbicort) he can call the office and we will call in what is covered, continue albuterol as needed   - continue with trelegy and nucala with albuterol as needed     2. Sarcoidosis: limited with no active issues   - Monitor closely for recurrence of lung involvement or systemic involvement. If these develop, the patient will need systemic steroids for treatment.   - CT chest  is stable     3. Samter's triad: status post polypectomy    - Continue with aspirin  desensitization and immunotherapy.     5. Allergic rhinitis, fairly controlled   - Continue with current medications and immunotherapy.        Follow-up 1 year       Sachin Frost MD 08/20/24 8:41 AM

## 2024-08-23 ENCOUNTER — APPOINTMENT (OUTPATIENT)
Dept: DERMATOLOGY | Facility: CLINIC | Age: 44
End: 2024-08-23
Payer: COMMERCIAL

## 2024-08-23 DIAGNOSIS — D86.9 SARCOIDOSIS: Primary | ICD-10-CM

## 2024-08-23 NOTE — PROGRESS NOTES
Subjective     Wale Cali is a 44 y.o. male who presents for the following: Sarcoidosis.     Review of Systems:  No other skin or systemic complaints other than what is documented elsewhere in the note.    The following portions of the chart were reviewed this encounter and updated as appropriate:          Skin Cancer History  No skin cancer on file.      Specialty Problems          Dermatology Problems    Skin lesion        Objective   Well appearing patient in no apparent distress; mood and affect are within normal limits.    A focused skin examination was performed . All findings within normal limits unless otherwise noted below.    Assessment/Plan   1. Sarcoidosis  Left Knee - Anterior  18 x 30 mm tan to reddish brown soft thin plaque (mostly on the medial aspect of the lesion). Slight reddish brown macules to the left deltoid and left superior knee area.     Patient reports pain to the knee area is more painful today and more puffy. Increased in size on the medial aspect. Left deltoid has resolved and left superior knee lesion has resolved. He wants to proceed with ILK today of the left knee lesion only.       Discussed risks, benefits, and alternatives to intralesional kenalog injections were discussed. Kenalog injections side effects of intra-lesional kenalog injections include depressed scar, stretch marks, discoloration (lighter colored skin), pain with injection, and low risk of infection. These changes are not expected based on the dose and amount of medication to be injected.Other treatment options include radiation, excision, cryotherapy, and laser treatments.     The patient wants to proceed with intralesional kenalog.  Pt expresses understanding of this risks and verbal consent was obtained from the patient to treat with intralesional Kenalog.  -Intralesional kenalog  10mg/ml and a total of  0.3ml was injected to affected area(s) after prepping the skin with alcohol. Pt tolerated the  procedure well with no complications. A total of 1 lesion(s) were treated.      triamcinolone acetonide (Kenalog) injection 3 mg - Left Knee - Anterior      Related Procedures  Follow Up In Dermatology - Established Patient    Related Medications  clobetasol (Temovate) 0.05 % ointment  Apply to affected area daily for 1 week then break for 1 week then repeat 1 week on and 1 week off.        Return in 4 weeks for recheck.

## 2024-08-24 ENCOUNTER — APPOINTMENT (OUTPATIENT)
Dept: OTOLARYNGOLOGY | Facility: CLINIC | Age: 44
End: 2024-08-24
Payer: COMMERCIAL

## 2024-09-27 ENCOUNTER — APPOINTMENT (OUTPATIENT)
Dept: DERMATOLOGY | Facility: CLINIC | Age: 44
End: 2024-09-27
Payer: COMMERCIAL

## 2024-09-27 DIAGNOSIS — D86.9 SARCOIDOSIS: ICD-10-CM

## 2024-09-27 PROCEDURE — 1036F TOBACCO NON-USER: CPT | Performed by: NURSE PRACTITIONER

## 2024-09-27 PROCEDURE — 11900 INJECT SKIN LESIONS </W 7: CPT | Performed by: NURSE PRACTITIONER

## 2024-09-27 NOTE — PROGRESS NOTES
Subjective     Wale Cali is a 44 y.o. male who presents for the following: Sarcoidosis.     Review of Systems:  No other skin or systemic complaints other than what is documented elsewhere in the note.    The following portions of the chart were reviewed this encounter and updated as appropriate:   Tobacco  Allergies  Meds  Problems  Med Hx  Surg Hx         Skin Cancer History  No skin cancer on file.      Specialty Problems          Dermatology Problems    Skin lesion        Objective   Well appearing patient in no apparent distress; mood and affect are within normal limits.    A focused skin examination was performed. All findings within normal limits unless otherwise noted below.    Assessment/Plan   1. Sarcoidosis  Left knee, Left deltoid  14 x 37 mm tan to reddish brown patch with raised areas on the periphery and expansion on the lateral aspect. Left deltoid lesions are more papular today as well.     Patient reports pain to the knee area is better from last injection but new area to the lateral aspect and deltoid lesions are a little more itchy as well. He wants to proceed with ILK today of the left knee lesion only.       Discussed risks, benefits, and alternatives to intralesional kenalog injections were discussed. Kenalog injections side effects of intra-lesional kenalog injections include depressed scar, stretch marks, discoloration (lighter colored skin), pain with injection, and low risk of infection. These changes are not expected based on the dose and amount of medication to be injected.Other treatment options include radiation, excision, cryotherapy, and laser treatments.     The patient wants to proceed with intralesional kenalog.  Pt expresses understanding of this risks and verbal consent was obtained from the patient to treat with intralesional Kenalog.  -Intralesional kenalog  10mg/ml and a total of  0.5ml was injected to affected area(s) after prepping the skin with  alcohol. Pt tolerated the procedure well with no complications. A total of 2 lesion(s) were treated.      triamcinolone acetonide (Kenalog) injection 10 mg - Left knee, Left deltoid      Related Procedures  Follow Up In Dermatology - Established Patient  Follow Up In Dermatology - Established Patient    Related Medications  clobetasol (Temovate) 0.05 % ointment  Apply to affected area daily for 1 week then break for 1 week then repeat 1 week on and 1 week off.        Return in 4 weeks

## 2024-10-25 ENCOUNTER — APPOINTMENT (OUTPATIENT)
Dept: DERMATOLOGY | Facility: CLINIC | Age: 44
End: 2024-10-25
Payer: COMMERCIAL

## 2024-10-25 DIAGNOSIS — D86.9 SARCOIDOSIS: ICD-10-CM

## 2024-10-25 PROCEDURE — 1036F TOBACCO NON-USER: CPT | Performed by: NURSE PRACTITIONER

## 2024-10-25 PROCEDURE — 99213 OFFICE O/P EST LOW 20 MIN: CPT | Performed by: NURSE PRACTITIONER

## 2024-10-25 RX ORDER — TACROLIMUS 1 MG/G
OINTMENT TOPICAL
Qty: 30 G | Refills: 1 | Status: SHIPPED | OUTPATIENT
Start: 2024-10-25

## 2024-10-25 NOTE — PROGRESS NOTES
Subjective     Wale Cali is a 44 y.o. male who presents for the following: Sarcoidosis.     Review of Systems:  No other skin or systemic complaints other than what is documented elsewhere in the note.    The following portions of the chart were reviewed this encounter and updated as appropriate:          Skin Cancer History  No skin cancer on file.      Specialty Problems          Dermatology Problems    Skin lesion        Objective   Well appearing patient in no apparent distress; mood and affect are within normal limits.    A focused skin examination was performed. All findings within normal limits unless otherwise noted below.    Assessment/Plan   1. Sarcoidosis  Left knee, Left deltoid  14 x 37 mm tan patch, no atrophy. Left deltoid area has a reddish brown plaque and a few reddish brown papules    This is a 44 y.o. male  following up for sarcoidosis. Skin on the knee looks great, Another area developed on the site after the injection 1 month ago and he started back clobetasol once daily for 1 week then break for 1 week then repeat and that cleared it up. Left deltoid area is worse. He would like to try tacrolimus BID. Advised if no improvement in a couple weeks may start back clobetasol once daily for 1 week then break for 1 week then repeat.       Reported adverse reactions of topical calcineurin inhibitors include, but are not limited to, burning, itching, rash or infection at the application site. Rare adverse effects include viral infections or allergic reactions. There is a black-box association with potential risk for lymphoma; however, large studies have shown this medication is safe for children and adults for maintenance use for skin conditions as steroid-sparing therapy.      Related Procedures  Follow Up In Dermatology - Established Patient    Related Medications  clobetasol (Temovate) 0.05 % ointment  Apply to affected area daily for 1 week then break for 1 week then repeat 1 week  on and 1 week off.    tacrolimus (Protopic) 0.1 % ointment  Apply to affected areas once or twice daily when needed for maintenance to prevent recurrence.        Return in 4 weeks

## 2024-11-22 ENCOUNTER — TELEPHONE (OUTPATIENT)
Dept: ALLERGY | Facility: CLINIC | Age: 44
End: 2024-11-22

## 2024-12-06 ENCOUNTER — APPOINTMENT (OUTPATIENT)
Dept: DERMATOLOGY | Facility: CLINIC | Age: 44
End: 2024-12-06
Payer: COMMERCIAL

## 2024-12-06 DIAGNOSIS — D86.9 SARCOIDOSIS: ICD-10-CM

## 2024-12-06 PROCEDURE — 1036F TOBACCO NON-USER: CPT | Performed by: NURSE PRACTITIONER

## 2024-12-06 PROCEDURE — 11900 INJECT SKIN LESIONS </W 7: CPT | Performed by: NURSE PRACTITIONER

## 2024-12-06 NOTE — PROGRESS NOTES
Subjective     Wale Cali is a 44 y.o. male who presents for the following: Sarcoidosis.     Review of Systems:  No other skin or systemic complaints other than what is documented elsewhere in the note.    The following portions of the chart were reviewed this encounter and updated as appropriate:          Skin Cancer History  No skin cancer on file.      Specialty Problems          Dermatology Problems    Skin lesion        Objective   Well appearing patient in no apparent distress; mood and affect are within normal limits.    A focused skin examination was performed. All findings within normal limits unless otherwise noted below.    Assessment/Plan   1. Sarcoidosis  Left knee, Left deltoid  12 x 35 mm tan patch, no atrophy. Left deltoid area has a reddish brown plaque and a few reddish brown papules. Reddish brown papule to the superior knee area    This is a 44 y.o. male  following up for sarcoidosis. Inferior knee lesion smaller compared to last exam. Patient reports a slightly elevated area new on the medial aspect, asking for treatment with ILK there. Also a new lesion to the superior knee area, given irritation patient is seeking treatment with ILK as well for that area. He has been applying tacrolimus once daily, advised to apply twice daily. May continue with clobetasol one week one and one week off for the deltoid area.      Reported adverse reactions of topical calcineurin inhibitors include, but are not limited to, burning, itching, rash or infection at the application site. Rare adverse effects include viral infections or allergic reactions. There is a black-box association with potential risk for lymphoma; however, large studies have shown this medication is safe for children and adults for maintenance use for skin conditions as steroid-sparing therapy.    Discussed risks, benefits, and alternatives to intralesional kenalog injections were discussed. Kenalog injections side effects of  intra-lesional kenalog injections include depressed scar, stretch marks, discoloration (lighter colored skin), pain with injection, and low risk of infection. These changes are not expected based on the dose and amount of medication to be injected.Other treatment options include radiation, excision, cryotherapy, and laser treatments.     The patient wants to proceed with intralesional kenalog.  Pt expresses understanding of this risks and verbal consent was obtained from the patient to treat with intralesional Kenalog.  -Intralesional kenalog  10mg/ml and a total of  0.2ml was injected to affected area(s) after prepping the skin with alcohol. Pt tolerated the procedure well with no complications. A total of 2 lesion(s) were treated.    Patient inquired about stump area having irritation. Noticed scale and small abrasion to the apex of pressure point on the distal anterior aspect of stump area. Patient is pending making of a new prosthetic. Advised patient to share need for adjustment for pressure point. He verbalized understanding and agreement.         Related Procedures  Follow Up In Dermatology - Established Patient    Related Medications  clobetasol (Temovate) 0.05 % ointment  Apply to affected area daily for 1 week then break for 1 week then repeat 1 week on and 1 week off.    tacrolimus (Protopic) 0.1 % ointment  Apply to affected areas once or twice daily when needed for maintenance to prevent recurrence.        Return in 4 weeks

## 2024-12-16 ENCOUNTER — APPOINTMENT (OUTPATIENT)
Dept: DERMATOLOGY | Facility: CLINIC | Age: 44
End: 2024-12-16
Payer: COMMERCIAL

## 2024-12-27 ENCOUNTER — APPOINTMENT (OUTPATIENT)
Dept: ALLERGY | Facility: CLINIC | Age: 44
End: 2024-12-27
Payer: COMMERCIAL

## 2024-12-27 VITALS
DIASTOLIC BLOOD PRESSURE: 99 MMHG | HEART RATE: 100 BPM | WEIGHT: 224.2 LBS | BODY MASS INDEX: 30.41 KG/M2 | SYSTOLIC BLOOD PRESSURE: 154 MMHG

## 2024-12-27 DIAGNOSIS — J33.8 NASAL SINUS POLYP: Primary | ICD-10-CM

## 2024-12-27 DIAGNOSIS — J30.1 ALLERGIC RHINITIS DUE TO POLLEN, UNSPECIFIED SEASONALITY: ICD-10-CM

## 2024-12-27 DIAGNOSIS — J45.50 SEVERE PERSISTENT ASTHMA, UNSPECIFIED WHETHER COMPLICATED (MULTI): ICD-10-CM

## 2024-12-27 DIAGNOSIS — J33.0 POLYP OF NASAL CAVITY: ICD-10-CM

## 2024-12-27 PROCEDURE — 3080F DIAST BP >= 90 MM HG: CPT | Performed by: ALLERGY & IMMUNOLOGY

## 2024-12-27 PROCEDURE — 99215 OFFICE O/P EST HI 40 MIN: CPT | Performed by: ALLERGY & IMMUNOLOGY

## 2024-12-27 PROCEDURE — 3077F SYST BP >= 140 MM HG: CPT | Performed by: ALLERGY & IMMUNOLOGY

## 2024-12-27 RX ORDER — PREDNISONE 20 MG/1
TABLET ORAL
Qty: 16 TABLET | Refills: 0 | Status: SHIPPED | OUTPATIENT
Start: 2024-12-27 | End: 2025-01-08

## 2024-12-27 NOTE — PROGRESS NOTES
"Subjective   Patient ID:   [id number]   Wale Cali is a 44 y.o. male who presents for Allergies (Here for allergy follow up.).    Chief Complaint   Patient presents with    Allergies     Here for allergy follow up.          HPI  This patient is here to evaluate for:      He is taking nucala at home, self injecting.   Unfortunately, he continues with symptoms of CRSwNP  Ears with pressure. Left side blocked, can't breath through it. Adversely affecting his sleep.  Last surgery was March '23 per Dr. Jane Mas. Mr. Cali is interested in a 2nd opinion from an ENT standpoint.     Also, we discussed changing his biological to Dupixent.     Last prednisone August '24 - it is \"amazing\" but effects only last   3 days in, smell returns. But goes away the day after he stops it.        Obstructive Sleep Apnea - Dr. Mas had been doing his supplies/ordering.   He is not sl    Albuterol 3-4x/week      Review of Systems   All other systems reviewed and are negative.        Objective     BP (!) 154/99 (BP Location: Left arm, Patient Position: Sitting)   Pulse 100   Wt 102 kg (224 lb 3.2 oz)   BMI 30.41 kg/m²      Physical Exam  Vitals and nursing note reviewed.   Constitutional:       General: He is not in acute distress.     Appearance: Normal appearance. He is normal weight. He is not ill-appearing.   HENT:      Head: Normocephalic and atraumatic.      Right Ear: Tympanic membrane, ear canal and external ear normal.      Left Ear: Tympanic membrane, ear canal and external ear normal.      Nose: Nose normal. No septal deviation, congestion or rhinorrhea.      Right Turbinates: Not enlarged, swollen or pale.      Left Turbinates: Not enlarged, swollen or pale.      Comments: Septum unremarkable without perforation or ulceration  Bilateral inferior turbinate hypertrophy so unable to see polyps seen on anterior examination.      Mouth/Throat:      Mouth: Mucous membranes are moist.      Pharynx: Oropharynx " is clear. No oropharyngeal exudate or posterior oropharyngeal erythema.   Eyes:      General:         Right eye: No discharge.         Left eye: No discharge.      Extraocular Movements: Extraocular movements intact.      Conjunctiva/sclera: Conjunctivae normal.      Pupils: Pupils are equal, round, and reactive to light.   Cardiovascular:      Rate and Rhythm: Normal rate and regular rhythm.      Pulses: Normal pulses.      Heart sounds: Normal heart sounds. No murmur heard.     No friction rub. No gallop.   Pulmonary:      Effort: Pulmonary effort is normal. No respiratory distress.      Breath sounds: Normal breath sounds. No stridor. No wheezing, rhonchi or rales.   Chest:      Chest wall: No tenderness.   Abdominal:      General: Abdomen is flat.      Palpations: Abdomen is soft.   Musculoskeletal:         General: Normal range of motion.      Cervical back: Normal range of motion and neck supple.   Lymphadenopathy:      Cervical: No cervical adenopathy.   Skin:     General: Skin is warm and dry.      Findings: No erythema, lesion or rash.   Neurological:      General: No focal deficit present.      Mental Status: He is alert and oriented to person, place, and time. Mental status is at baseline.   Psychiatric:         Mood and Affect: Mood normal.         Behavior: Behavior normal.         Thought Content: Thought content normal.         Judgment: Judgment normal.            Current Outpatient Medications   Medication Sig Dispense Refill    albuterol 90 mcg/actuation inhaler Inhale.      aspirin 325 mg tablet Take 1 tablet (325 mg) by mouth 2 times a day.      atorvastatin (Lipitor) 80 mg tablet Take 1 tablet (80 mg) by mouth once daily. 90 tablet 1    azelastine (Astelin) 137 mcg (0.1 %) nasal spray USE TWO SPRAYS NASALLY TWO TIMES A DAY 90 mL 0    clobetasol (Temovate) 0.05 % ointment Apply to affected area daily for 1 week then break for 1 week then repeat 1 week on and 1 week off. 60 g 1    clotrimazole  (Lotrimin) 1 % cream Apply sparingly twice daily.      cyclobenzaprine (Flexeril) 5 mg tablet Take 1 tablet (5 mg) by mouth 3 times a day as needed.      EPINEPHrine 0.3 mg/0.3 mL injection syringe USE AS DIRECTED IN CASE OF A SEVERE ALLERGIC REACTION      fexofenadine (Allegra) 180 mg tablet [id number] Medication fexofenadine 180 mg tablet Allegra Allergy 180 mg 6/28/2016 Active (Outside)      fluocinolone (DermOtic) 0.01 % ear drops Administer into affected ear(s).      fluticasone propionate (Xhance) 93 mcg/actuation aerosol breath activated Administer into affected nostril(s).      fluticasone-umeclidin-vilanter (Trelegy Ellipta) 200-62.5-25 mcg blister with device Inhale 1 puff once daily. 1 each 11    lisinopril 20 mg tablet Take 1 tablet (20 mg) by mouth once daily. 90 tablet 1    mepolizumab (Nucala) 100 mg/mL injection Inject 1 mL (100 mg) under the skin every 28 (twenty-eight) days. 1 each 11    methylPREDNISolone (Medrol Dospak) 4 mg tablets Follow schedule on package instructions 21 tablet 0    mometasone (Elocon) 0.1 % cream Apply topically once daily as needed (For itching). 15 g 3    mometasone (Elocon) 0.1 % ointment Apply topically 2 times a day. Apply twice a day until the lesions are flat and smooth. 1 weeks on/1 week off, and you may repeat this. 45 g 5    montelukast (Singulair) 10 mg tablet Take 1 tablet (10 mg) by mouth once daily. 30 tablet 11    multivitamin tablet Take by mouth.      mupirocin (Bactroban) 2 % ointment Apply topically twice a day.      Nucala 100 mg/mL syringe INJECT 100MG SUBCUTANEOUSLY  EVERY 4 WEEKS 1 mL 11    pantoprazole (ProtoNix) 40 mg EC tablet Take 1 tablet (40 mg) by mouth once daily in the morning. Take before meals. Do not crush, chew, or split. 90 tablet 1    predniSONE (Deltasone) 10 mg tablet Take 1 tabs (10mg) TID for 5 days, then take 1 tabs (10mg) qAM for 5 days. 20 tablet 0    tacrolimus (Protopic) 0.1 % ointment Apply to affected areas once or twice daily  when needed for maintenance to prevent recurrence. 30 g 1    triamcinolone (Kenalog) 0.5 % cream twice a day.       No current facility-administered medications for this visit.       Summary of the labs over the past 6 months:    No visits with results within 6 Month(s) from this visit.   Latest known visit with results is:   Office Visit on 05/17/2024   Component Date Value Ref Range Status    Case Report 05/17/2024    Final                    Value:Dermatopathology                                  Case: [id number]                                   Authorizing Provider:  MOOK Montanez Collected:           05/17/2024 0805              Ordering Location:     [facility] [place] [facility] [place]   Received:            05/17/2024 1505              Pathologist:           Elijah Jaimes MD                                                             Specimen:    SKIN, Left Buccal Cheek                                                                    FINAL DIAGNOSIS 05/17/2024    Final                    Value:SKIN, LEFT BUCCAL CHEEK, SHAVE BIOPSY:                          DERMAL NEVUS, PRESENT ON THE DEEP AND PERIPHERAL MARGIN.                                                    ** Electronically signed out by Elijah Jaimes MD **                                05/17/2024    Final                    Value:By the signature on this report, the individual or group listed as making                           the Final Interpretation/Diagnosis certifies that they have reviewed this                           case.     Clinical History 05/17/2024    Final                    Value:Encounter Diagnosis: Neoplasm of uncertain behavior of skin                                                                               [id number]                          Collection Comments: Differential Diagnosis: Irritated dermal nevus vs EIC                           vs less likely NMSC                            Check Margins Yes/No?:                             Comments:                            Dermpath Lab: Routine Histopathology (formalin-fixed tissue)                          Finding Region: Left Buccal Cheek                          Specimen Objective: 4.5 x 3.5 mm tan brown pink papule    Microscopic Description 05/17/2024    Final                    Value:Microscopic analysis shows a symmetric, papular proliferation of bland                           melanocytes in dermis.  Dermal melanocytes mature with increasing depth in                           dermis and show no cytologic atypia.                                                        Gross Description 05/17/2024    Final                    Value:A:                          Received in formalin is a 6 x 6 x 3 mm piece of skin.  It is tan and brown                           in color.  It is shave in shape.  It was embedded in toto .  The specimen                           was inked.                                                      The specimen was grossed by Diann De Paz.          Assessment/Plan   Diagnoses and all orders for this visit:  Nasal sinus polyp  -     Referral to ENT; Future  -     dupilumab (Dupixent) 300 mg/2 mL prefilled syringe; Inject 1 Syringe (300 mg) under the skin every 14 (fourteen) days.  -     predniSONE (Deltasone) 20 mg tablet; Take 1 tablet (20 mg) by mouth 2 times a day for 4 days, THEN 1 tablet (20 mg) once daily for 4 days, THEN 0.5 tablets (10 mg) once daily for 4 days.  Severe persistent asthma, unspecified whether complicated (Multi)  -     Referral to ENT; Future  -     dupilumab (Dupixent) 300 mg/2 mL prefilled syringe; Inject 1 Syringe (300 mg) under the skin every 14 (fourteen) days.  -     predniSONE (Deltasone) 20 mg tablet; Take 1 tablet (20 mg) by mouth 2 times a day for 4 days, THEN 1 tablet (20 mg) once daily for 4 days, THEN 0.5 tablets (10 mg) once daily for 4 days.  Allergic rhinitis due to pollen, unspecified seasonality  -     Referral to ENT;  Future  -     predniSONE (Deltasone) 20 mg tablet; Take 1 tablet (20 mg) by mouth 2 times a day for 4 days, THEN 1 tablet (20 mg) once daily for 4 days, THEN 0.5 tablets (10 mg) once daily for 4 days.  Polyp of nasal cavity    Due to history of CRS with Nasal polyposis, requiring multiple sinus surgeries/polypectomy (6 surgeries in total), requiring oral steroids, and not responsive (lack of efficacy) with Nucala injections.     Will  order DUPIXENT injection    Due to your history of persistent nasal polyps with rapid regrowth along with frequent sinus infections despite your recent surgery, I would recommend a trial of Dupilumab, which is indicated to help with nasal polyposis.  The injection is done at home every 2 weeks.   We reviewed the benefits and risks of dupixent (Dupilumab) and reviewed the side effect profile. From [employer]: This includes Injection site disorder (6% to 18% ), Oral herpes simplex infection (3% to 4% ), Pain in throat, and mouth (2% ), Blepharitis (Up to 5% ), Conjunctivitis (chronic rhinosinusitis with nasal polyposis, 2% ), Dry eyes (Up to 2% ). Serious side effects: Cardiovascular: Arterial thromboembolism (0% to 0.9% ), Vasculitis; Hematologic: Eosinophil count raised (1% to 2% ); Immunologic: Hypersensitivity reaction (Less than 1% ); Musculoskeletal: Arthralgia (3% ); Respiratory: Pneumonia, Eosinophilic; Other: Angioedema, Helminth infection    Continue xhance nasal  Continue Azelastine nasal  Continue Trelegy - this is maintaining his asthma control.   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   Will prescribe this for you.      Benjamin Hayes MD     Time was spent: 40 minutes - Preparing to see the patient, obtaining and/or reviewing separately obtained history, performing a medically necessary appropriate  physical examination and/or evaluation, counseling and educating the patient/family, ordering medications, tests or procedures, referring and communicating with other providers, documenting clinical information in the medical record, independently interpreting results and communicating results to the patient/family, and care coordination.

## 2025-01-10 ENCOUNTER — APPOINTMENT (OUTPATIENT)
Dept: DERMATOLOGY | Facility: CLINIC | Age: 45
End: 2025-01-10
Payer: COMMERCIAL

## 2025-01-10 DIAGNOSIS — D86.9 SARCOIDOSIS: ICD-10-CM

## 2025-01-10 DIAGNOSIS — J33.8 NASAL SINUS POLYP: ICD-10-CM

## 2025-01-10 PROCEDURE — 1036F TOBACCO NON-USER: CPT | Performed by: NURSE PRACTITIONER

## 2025-01-10 PROCEDURE — 99213 OFFICE O/P EST LOW 20 MIN: CPT | Performed by: NURSE PRACTITIONER

## 2025-01-10 NOTE — PROGRESS NOTES
Subjective     Wale Cali is a 44 y.o. male who presents for the following: Sarcoidosis.     Review of Systems:  No other skin or systemic complaints other than what is documented elsewhere in the note.    The following portions of the chart were reviewed this encounter and updated as appropriate:          Skin Cancer History  No skin cancer on file.      Specialty Problems          Dermatology Problems    Skin lesion        Objective   Well appearing patient in no apparent distress; mood and affect are within normal limits.    A focused skin examination was performed. All findings within normal limits unless otherwise noted below.    Assessment/Plan   1. Sarcoidosis  Left knee, Left deltoid  12 x 35 mm tan patch, no atrophy. Left deltoid area has a few thin reddish brown papules.     This is a 44 y.o. male  following up for sarcoidosis. No new areas to the left knee. Left deltoid area lesions are also improving. Much improved overall with tacrolimus and seems to be working great for controlling sarcoidosis in those areas.  Advised to continue with applying tacrolimus twice daily. Recheck in 2 months.    The following information regarding the use of topical steroids was provided: Local skin thinning,striae, and telangiectasia can occur with chronic application of this medication.  Long term use oftopical steroids should be avoided      Related Procedures  Follow Up In Dermatology - Established Patient    Related Medications  clobetasol (Temovate) 0.05 % ointment  Apply to affected area daily for 1 week then break for 1 week then repeat 1 week on and 1 week off.    tacrolimus (Protopic) 0.1 % ointment  Apply to affected areas once or twice daily when needed for maintenance to prevent recurrence.    2. Nasal sinus polyp    Related Procedures  Referral to ENT    Related Medications  Nucala 100 mg/mL syringe  INJECT 100MG SUBCUTANEOUSLY  EVERY 4 WEEKS    predniSONE (Deltasone) 10 mg tablet  Take 1 tabs  (10mg) TID for 5 days, then take 1 tabs (10mg) qAM for 5 days.    montelukast (Singulair) 10 mg tablet  Take 1 tablet (10 mg) by mouth once daily.    fluticasone-umeclidin-vilanter (Trelegy Ellipta) 200-62.5-25 mcg blister with device  Inhale 1 puff once daily.    dupilumab (Dupixent) 300 mg/2 mL prefilled syringe  Inject 1 Syringe (300 mg) under the skin every 14 (fourteen) days.        Return in 6 months for routine skin check or return to clinic sooner if needed

## 2025-02-18 ENCOUNTER — APPOINTMENT (OUTPATIENT)
Dept: PRIMARY CARE | Facility: CLINIC | Age: 45
End: 2025-02-18
Payer: COMMERCIAL

## 2025-02-19 ENCOUNTER — APPOINTMENT (OUTPATIENT)
Dept: PRIMARY CARE | Facility: CLINIC | Age: 45
End: 2025-02-19
Payer: COMMERCIAL

## 2025-02-19 VITALS
HEART RATE: 63 BPM | BODY MASS INDEX: 33.53 KG/M2 | SYSTOLIC BLOOD PRESSURE: 124 MMHG | DIASTOLIC BLOOD PRESSURE: 86 MMHG | WEIGHT: 234.2 LBS | HEIGHT: 70 IN | OXYGEN SATURATION: 99 % | TEMPERATURE: 97.1 F

## 2025-02-19 DIAGNOSIS — D71 GRANULOMATOUS DISEASE (MULTI): ICD-10-CM

## 2025-02-19 DIAGNOSIS — Z89.512 HISTORY OF LEFT BELOW KNEE AMPUTATION (MULTI): ICD-10-CM

## 2025-02-19 DIAGNOSIS — Z12.11 SCREENING FOR COLON CANCER: ICD-10-CM

## 2025-02-19 DIAGNOSIS — T87.89 STUMP PAIN (MULTI): ICD-10-CM

## 2025-02-19 DIAGNOSIS — I10 ESSENTIAL HYPERTENSION: ICD-10-CM

## 2025-02-19 DIAGNOSIS — E78.00 PURE HYPERCHOLESTEROLEMIA: ICD-10-CM

## 2025-02-19 DIAGNOSIS — K21.9 GERD WITHOUT ESOPHAGITIS: ICD-10-CM

## 2025-02-19 DIAGNOSIS — Z00.00 ANNUAL PHYSICAL EXAM: Primary | ICD-10-CM

## 2025-02-19 DIAGNOSIS — J84.10 GRANULOMATOUS LUNG DISEASE (MULTI): ICD-10-CM

## 2025-02-19 DIAGNOSIS — M79.609 STUMP PAIN (MULTI): ICD-10-CM

## 2025-02-19 DIAGNOSIS — M19.049 HAND ARTHRITIS: ICD-10-CM

## 2025-02-19 PROCEDURE — 3074F SYST BP LT 130 MM HG: CPT | Performed by: INTERNAL MEDICINE

## 2025-02-19 PROCEDURE — 99214 OFFICE O/P EST MOD 30 MIN: CPT | Performed by: INTERNAL MEDICINE

## 2025-02-19 PROCEDURE — 3079F DIAST BP 80-89 MM HG: CPT | Performed by: INTERNAL MEDICINE

## 2025-02-19 PROCEDURE — 3008F BODY MASS INDEX DOCD: CPT | Performed by: INTERNAL MEDICINE

## 2025-02-19 PROCEDURE — 99396 PREV VISIT EST AGE 40-64: CPT | Performed by: INTERNAL MEDICINE

## 2025-02-19 PROCEDURE — 1036F TOBACCO NON-USER: CPT | Performed by: INTERNAL MEDICINE

## 2025-02-19 RX ORDER — LISINOPRIL 20 MG/1
20 TABLET ORAL DAILY
Qty: 90 TABLET | Refills: 1 | Status: SHIPPED | OUTPATIENT
Start: 2025-02-19

## 2025-02-19 RX ORDER — ATORVASTATIN CALCIUM 80 MG/1
80 TABLET, FILM COATED ORAL DAILY
Qty: 90 TABLET | Refills: 1 | Status: SHIPPED | OUTPATIENT
Start: 2025-02-19

## 2025-02-19 RX ORDER — DICLOFENAC SODIUM 10 MG/G
4 GEL TOPICAL 4 TIMES DAILY
Qty: 100 G | Refills: 1 | Status: SHIPPED | OUTPATIENT
Start: 2025-02-19

## 2025-02-19 RX ORDER — PANTOPRAZOLE SODIUM 40 MG/1
40 TABLET, DELAYED RELEASE ORAL
Qty: 90 TABLET | Refills: 1 | Status: SHIPPED | OUTPATIENT
Start: 2025-02-19

## 2025-02-19 ASSESSMENT — ANXIETY QUESTIONNAIRES
3. WORRYING TOO MUCH ABOUT DIFFERENT THINGS: NOT AT ALL
7. FEELING AFRAID AS IF SOMETHING AWFUL MIGHT HAPPEN: NOT AT ALL
GAD7 TOTAL SCORE: 0
6. BECOMING EASILY ANNOYED OR IRRITABLE: NOT AT ALL
2. NOT BEING ABLE TO STOP OR CONTROL WORRYING: NOT AT ALL
4. TROUBLE RELAXING: NOT AT ALL
5. BEING SO RESTLESS THAT IT IS HARD TO SIT STILL: NOT AT ALL
1. FEELING NERVOUS, ANXIOUS, OR ON EDGE: NOT AT ALL

## 2025-02-19 ASSESSMENT — ENCOUNTER SYMPTOMS
COUGH: 0
ARTHRALGIAS: 1
UNEXPECTED WEIGHT CHANGE: 0
DIARRHEA: 0
DIZZINESS: 0
BRUISES/BLEEDS EASILY: 0
SINUS PAIN: 0
WHEEZING: 0
ABDOMINAL PAIN: 0
DIFFICULTY URINATING: 0
PALPITATIONS: 0
SORE THROAT: 0
BLOOD IN STOOL: 0
HEADACHES: 0
FATIGUE: 0
FEVER: 0

## 2025-02-19 NOTE — PROGRESS NOTES
Subjective   Patient ID: Wale Cali is a 44 y.o. male who presents for Annual Exam.    Annual preventive visit and preventative care  - Vaccination reviewed and up-to-date  -Need to screen for colon cancer order placed today  - Need to proceed with complete blood work  - Screening for depression negative  -Comes about diet controlled counseled about obesity and weight loss    Follow-up  - Right hand arthritis may use Voltaren gel as needed given instruction about the use and side effects  --Left below knee amputation with a skin granulation tissue much better improved awaiting new prosthesis  --Hypertension controlled to continue with lisinopril doing well controlled, patient off amlodipine edema improved, no recurrence continue low-salt diet  - Spiculated lung lesion follow-up pulmonary, most likely inflammatory seen by pulmonary service recommendation CT showed granulomatous lung disease follow-up CT per pulmonary recommendation.  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   HENT:  Negative for congestion, ear discharge, ear pain, mouth sores, sinus pain and sore throat.    Eyes:  Negative for visual disturbance.   Respiratory:  Negative for cough and wheezing.    Cardiovascular:  Negative for chest pain, palpitations and leg swelling.   Gastrointestinal:  Negative for abdominal pain, blood in stool and diarrhea.   Genitourinary:  Negative for  "difficulty urinating.   Musculoskeletal:  Positive for arthralgias.   Skin:  Negative for rash.   Neurological:  Negative for dizziness and headaches.   Hematological:  Does not bruise/bleed easily.   Psychiatric/Behavioral:  Negative for behavioral problems.    All other systems reviewed and are negative.      Objective   Lab Results   Component Value Date    HGBA1C 5.0 02/13/2024      /86   Pulse 63   Temp 36.2 °C (97.1 °F)   Ht 1.778 m (5' 10\")   Wt 106 kg (234 lb 3.2 oz)   SpO2 99%   BMI 33.60 kg/m²     Physical Exam  Vitals and nursing note reviewed.   Constitutional:       Appearance: Normal appearance.   HENT:      Head: Normocephalic.      Nose: Nose normal.   Eyes:      Conjunctiva/sclera: Conjunctivae normal.      Pupils: Pupils are equal, round, and reactive to light.   Cardiovascular:      Rate and Rhythm: Regular rhythm.   Pulmonary:      Effort: Pulmonary effort is normal.      Breath sounds: Normal breath sounds.   Abdominal:      General: Abdomen is flat.      Palpations: Abdomen is soft.   Musculoskeletal:         General: Tenderness (Right hand arthropathy) and signs of injury (Left below-knee amputation) present.      Cervical back: Neck supple.   Skin:     General: Skin is warm.   Neurological:      General: No focal deficit present.      Mental Status: He is oriented to person, place, and time.   Psychiatric:         Mood and Affect: Mood normal.         Assessment/Plan   Wale was seen today for annual exam.  Diagnoses and all orders for this visit:  Annual physical exam (Primary)  Pure hypercholesterolemia  -     atorvastatin (Lipitor) 80 mg tablet; Take 1 tablet (80 mg) by mouth once daily.  Essential hypertension  -     lisinopril 20 mg tablet; Take 1 tablet (20 mg) by mouth once daily.  GERD without esophagitis  -     pantoprazole (ProtoNix) 40 mg EC tablet; Take 1 tablet (40 mg) by mouth once daily in the morning. Take before meals. Do not crush, chew, or split.  Hand " arthritis  -     diclofenac sodium (Voltaren) 1 % gel; Apply 4.5 inches (4 g) topically 4 times a day.  Screening for colon cancer  -     Colonoscopy Screening; Average Risk Patient; Future  Stump pain (Multi)  Granulomatous lung disease (Multi)  Granulomatous disease (Multi)  History of left below knee amputation (Multi)  Other orders  -     Follow Up In Primary Care - Health Maintenance  -     Follow Up In Primary Care - Established; Future   Annual preventive visit and preventative care  - Vaccination reviewed and up-to-date  -Need to screen for colon cancer order placed today  - Need to proceed with complete blood work  - Screening for depression negative  -Comes about diet controlled counseled about obesity and weight loss    Follow-up  - Right hand arthritis may use Voltaren gel as needed given instruction about the use and side effects  --Left below knee amputation with a skin granulation tissue much better improved awaiting new prosthesis  --Hypertension controlled to continue with lisinopril doing well controlled, patient off amlodipine edema improved, no recurrence continue low-salt diet  - Spiculated lung lesion follow-up pulmonary, most likely inflammatory seen by pulmonary service recommendation CT showed granulomatous lung disease follow-up CT per pulmonary recommendation.  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

## 2025-02-20 LAB
25(OH)D3+25(OH)D2 SERPL-MCNC: 28 NG/ML (ref 30–100)
ALBUMIN SERPL-MCNC: 4.4 G/DL (ref 3.6–5.1)
ALP SERPL-CCNC: 67 U/L (ref 36–130)
ALT SERPL-CCNC: 27 U/L (ref 9–46)
ANION GAP SERPL CALCULATED.4IONS-SCNC: 7 MMOL/L (CALC) (ref 7–17)
AST SERPL-CCNC: 20 U/L (ref 10–40)
BASOPHILS # BLD AUTO: 19 CELLS/UL (ref 0–200)
BASOPHILS NFR BLD AUTO: 0.4 %
BILIRUB SERPL-MCNC: 0.7 MG/DL (ref 0.2–1.2)
BUN SERPL-MCNC: 16 MG/DL (ref 7–25)
CALCIUM SERPL-MCNC: 9.2 MG/DL (ref 8.6–10.3)
CHLORIDE SERPL-SCNC: 105 MMOL/L (ref 98–110)
CHOLEST SERPL-MCNC: 148 MG/DL
CHOLEST/HDLC SERPL: 3.9 (CALC)
CO2 SERPL-SCNC: 29 MMOL/L (ref 20–32)
CREAT SERPL-MCNC: 0.91 MG/DL (ref 0.6–1.29)
EGFRCR SERPLBLD CKD-EPI 2021: 105 ML/MIN/1.73M2
EOSINOPHIL # BLD AUTO: 221 CELLS/UL (ref 15–500)
EOSINOPHIL NFR BLD AUTO: 4.6 %
ERYTHROCYTE [DISTWIDTH] IN BLOOD BY AUTOMATED COUNT: 12.5 % (ref 11–15)
EST. AVERAGE GLUCOSE BLD GHB EST-MCNC: 108 MG/DL
EST. AVERAGE GLUCOSE BLD GHB EST-SCNC: 6 MMOL/L
GLUCOSE SERPL-MCNC: 108 MG/DL (ref 65–99)
HBA1C MFR BLD: 5.4 % OF TOTAL HGB
HCT VFR BLD AUTO: 44.6 % (ref 38.5–50)
HDLC SERPL-MCNC: 38 MG/DL
HGB BLD-MCNC: 15.1 G/DL (ref 13.2–17.1)
LDLC SERPL CALC-MCNC: 85 MG/DL (CALC)
LYMPHOCYTES # BLD AUTO: 1042 CELLS/UL (ref 850–3900)
LYMPHOCYTES NFR BLD AUTO: 21.7 %
MAGNESIUM SERPL-MCNC: 2.1 MG/DL (ref 1.5–2.5)
MCH RBC QN AUTO: 32.3 PG (ref 27–33)
MCHC RBC AUTO-ENTMCNC: 33.9 G/DL (ref 32–36)
MCV RBC AUTO: 95.5 FL (ref 80–100)
MONOCYTES # BLD AUTO: 605 CELLS/UL (ref 200–950)
MONOCYTES NFR BLD AUTO: 12.6 %
NEUTROPHILS # BLD AUTO: 2914 CELLS/UL (ref 1500–7800)
NEUTROPHILS NFR BLD AUTO: 60.7 %
NONHDLC SERPL-MCNC: 110 MG/DL (CALC)
PLATELET # BLD AUTO: 201 THOUSAND/UL (ref 140–400)
PMV BLD REES-ECKER: 10.4 FL (ref 7.5–12.5)
POTASSIUM SERPL-SCNC: 4.6 MMOL/L (ref 3.5–5.3)
PROT SERPL-MCNC: 6.8 G/DL (ref 6.1–8.1)
RBC # BLD AUTO: 4.67 MILLION/UL (ref 4.2–5.8)
SODIUM SERPL-SCNC: 141 MMOL/L (ref 135–146)
TRIGL SERPL-MCNC: 152 MG/DL
TSH SERPL-ACNC: 1.49 MIU/L (ref 0.4–4.5)
WBC # BLD AUTO: 4.8 THOUSAND/UL (ref 3.8–10.8)

## 2025-03-14 ENCOUNTER — APPOINTMENT (OUTPATIENT)
Dept: DERMATOLOGY | Facility: CLINIC | Age: 45
End: 2025-03-14
Payer: COMMERCIAL

## 2025-03-14 DIAGNOSIS — D86.9 SARCOIDOSIS: Primary | ICD-10-CM

## 2025-03-14 PROCEDURE — 11900 INJECT SKIN LESIONS </W 7: CPT | Performed by: NURSE PRACTITIONER

## 2025-03-14 PROCEDURE — 1036F TOBACCO NON-USER: CPT | Performed by: NURSE PRACTITIONER

## 2025-03-14 NOTE — PROGRESS NOTES
Subjective     Wale Cali is a 44 y.o. male who presents for the following: Sarcoidosis.     Review of Systems:  No other skin or systemic complaints other than what is documented elsewhere in the note.    The following portions of the chart were reviewed this encounter and updated as appropriate:   Tobacco  Allergies  Meds  Problems  Med Hx  Surg Hx         Skin Cancer History  No skin cancer on file.      Specialty Problems          Dermatology Problems    Skin lesion        Objective   Well appearing patient in no apparent distress; mood and affect are within normal limits.    A focused skin examination was performed. All findings within normal limits unless otherwise noted below.    Assessment/Plan   1. Sarcoidosis  Left knee, Left deltoid  12 x 35 mm tan patch, no atrophy. 2 small papules noted in the patch. Left deltoid area has a few thin reddish brown papules, some somewhat scaly.      This is a 44 y.o. male  following up for sarcoidosis. Left knee is stable in size but has 2 new small papules noted. Left deltoid area lesions are also overall stable but a little itchy per patient.  Much improved overall with tacrolimus still. Continue with twice daily application. Will proceed with ILK per patient request today given being tender on the left knee area. He is pending new prosthetic. Follow up in 2 months for recheck.     Discussed risks, benefits, and alternatives to intralesional kenalog injections were discussed. Kenalog injections side effects of intra-lesional kenalog injections include depressed scar, stretch marks, discoloration (lighter colored skin), pain with injection, and low risk of infection. These changes are not expected based on the dose and amount of medication to be injected.Other treatment options include radiation, excision, cryotherapy, and laser treatments.     The patient wants to proceed with intralesional kenalog.  Pt expresses understanding of this risks and  verbal consent was obtained from the patient to treat with intralesional Kenalog.  -Intralesional kenalog  10mg/ml and a total of  0.5 ml was injected to affected area(s) after prepping the skin with alcohol. Pt tolerated the procedure well with no complications. A total of 5 lesion(s) were treated.     The following information regarding the use of topical steroids was provided: Local skin thinning,striae, and telangiectasia can occur with chronic application of this medication.  Long term use oftopical steroids should be avoided      Related Medications  clobetasol (Temovate) 0.05 % ointment  Apply to affected area daily for 1 week then break for 1 week then repeat 1 week on and 1 week off.    tacrolimus (Protopic) 0.1 % ointment  Apply to affected areas once or twice daily when needed for maintenance to prevent recurrence.        Return in 2 months for routine check or return to clinic sooner if needed

## 2025-03-19 ENCOUNTER — APPOINTMENT (OUTPATIENT)
Dept: ALLERGY | Facility: CLINIC | Age: 45
End: 2025-03-19
Payer: COMMERCIAL

## 2025-03-19 VITALS
DIASTOLIC BLOOD PRESSURE: 84 MMHG | HEART RATE: 67 BPM | SYSTOLIC BLOOD PRESSURE: 139 MMHG | BODY MASS INDEX: 32.28 KG/M2 | WEIGHT: 225 LBS

## 2025-03-19 DIAGNOSIS — J30.89 NON-SEASONAL ALLERGIC RHINITIS, UNSPECIFIED TRIGGER: ICD-10-CM

## 2025-03-19 PROCEDURE — 3075F SYST BP GE 130 - 139MM HG: CPT | Performed by: ALLERGY & IMMUNOLOGY

## 2025-03-19 PROCEDURE — 99215 OFFICE O/P EST HI 40 MIN: CPT | Performed by: ALLERGY & IMMUNOLOGY

## 2025-03-19 PROCEDURE — 3079F DIAST BP 80-89 MM HG: CPT | Performed by: ALLERGY & IMMUNOLOGY

## 2025-03-19 RX ORDER — AZELASTINE 1 MG/ML
1 SPRAY, METERED NASAL 2 TIMES DAILY
Qty: 90 ML | Refills: 3 | Status: SHIPPED | OUTPATIENT
Start: 2025-03-19

## 2025-03-19 NOTE — PROGRESS NOTES
Subjective   Patient ID:   [id number]   Wale Cali is a 44 y.o. male who presents for Allergies (Here for 3 month FUV for aspirin allergy and med checkup.).    Chief Complaint   Patient presents with   • Allergies     Here for 3 month FUV for aspirin allergy and med checkup.        HPI  This patient is here to evaluate for:    Started dupixent in January.  No side effects - no ocular issues.  He does use readers for the past 5 years.     Can smell strong smells so this is an improvement    He will be seeing an ENT in [place] 4/1/25 with Dr. Trujillo.    Off montelukast and allegra, he has more asthma symptoms and more congestion.  Harder to use his cpap also.    Trelelgy - the best inhaler he has used. His asthma is under much better control  Uses it at bedtime    Also still using the nasal azelastine    Continue to see the eye doctor yearly (due to history of sarcoidosis).    Pmxh: was getting polypectomy q6mo prior to seeing Allergy.  Afterwards, much less but did need polypectomy approx 2 years ago.  Also sinus/polyp surgery in Jan '18   Started dupixent January 3, 2025      Review of Systems   All other systems reviewed and are negative.        Objective     /84 (BP Location: Right arm, Patient Position: Sitting)   Pulse 67   Wt 102 kg (225 lb)   BMI 32.28 kg/m²      Physical Exam  Vitals and nursing note reviewed.   Constitutional:       General: He is not in acute distress.     Appearance: Normal appearance. He is normal weight. He is not ill-appearing.   HENT:      Head: Normocephalic and atraumatic.      Right Ear: Tympanic membrane, ear canal and external ear normal.      Left Ear: Tympanic membrane, ear canal and external ear normal.      Nose: Nose normal. No septal deviation, congestion or rhinorrhea.      Right Turbinates: Not enlarged, swollen or pale.      Left Turbinates: Not enlarged, swollen or pale.      Comments: Right deviated Septum but no perforation or  ulceration  Bilateral inferior turbinate hypertrophy more on right;  unable to see polyps seen on anterior examination.      Mouth/Throat:      Mouth: Mucous membranes are moist.      Pharynx: Oropharynx is clear. No oropharyngeal exudate or posterior oropharyngeal erythema.   Eyes:      General:         Right eye: No discharge.         Left eye: No discharge.      Extraocular Movements: Extraocular movements intact.      Conjunctiva/sclera: Conjunctivae normal.      Pupils: Pupils are equal, round, and reactive to light.   Cardiovascular:      Rate and Rhythm: Normal rate and regular rhythm.      Pulses: Normal pulses.      Heart sounds: Normal heart sounds. No murmur heard.     No friction rub. No gallop.   Pulmonary:      Effort: Pulmonary effort is normal. No respiratory distress.      Breath sounds: Normal breath sounds. No stridor. No wheezing, rhonchi or rales.   Chest:      Chest wall: No tenderness.   Abdominal:      General: Abdomen is flat.      Palpations: Abdomen is soft.   Musculoskeletal:         General: Normal range of motion.      Cervical back: Normal range of motion and neck supple.   Lymphadenopathy:      Cervical: No cervical adenopathy.   Skin:     General: Skin is warm and dry.      Findings: No erythema, lesion or rash.   Neurological:      General: No focal deficit present.      Mental Status: He is alert and oriented to person, place, and time. Mental status is at baseline.   Psychiatric:         Mood and Affect: Mood normal.         Behavior: Behavior normal.         Thought Content: Thought content normal.         Judgment: Judgment normal.          Current Outpatient Medications   Medication Sig Dispense Refill   • albuterol 90 mcg/actuation inhaler Inhale.     • aspirin 325 mg tablet Take 1 tablet (325 mg) by mouth 2 times a day.     • atorvastatin (Lipitor) 80 mg tablet Take 1 tablet (80 mg) by mouth once daily. 90 tablet 1   • azelastine (Astelin) 137 mcg (0.1 %) nasal spray USE TWO  SPRAYS NASALLY TWO TIMES A DAY 90 mL 0   • clobetasol (Temovate) 0.05 % ointment Apply to affected area daily for 1 week then break for 1 week then repeat 1 week on and 1 week off. 60 g 1   • clotrimazole (Lotrimin) 1 % cream Apply sparingly twice daily.     • cyclobenzaprine (Flexeril) 5 mg tablet Take 1 tablet (5 mg) by mouth 3 times a day as needed.     • diclofenac sodium (Voltaren) 1 % gel Apply 4.5 inches (4 g) topically 4 times a day. 100 g 1   • dupilumab (Dupixent) 300 mg/2 mL prefilled syringe Inject 1 Syringe (300 mg) under the skin every 14 (fourteen) days. 4 mL 11   • EPINEPHrine 0.3 mg/0.3 mL injection syringe USE AS DIRECTED IN CASE OF A SEVERE ALLERGIC REACTION     • fexofenadine (Allegra) 180 mg tablet [id number] Medication fexofenadine 180 mg tablet Allegra Allergy 180 mg 6/28/2016 Active (Outside)     • fluocinolone (DermOtic) 0.01 % ear drops Administer into affected ear(s).     • fluticasone propionate (Xhance) 93 mcg/actuation aerosol breath activated Administer into affected nostril(s).     • fluticasone-umeclidin-vilanter (Trelegy Ellipta) 200-62.5-25 mcg blister with device Inhale 1 puff once daily. 1 each 11   • lisinopril 20 mg tablet Take 1 tablet (20 mg) by mouth once daily. 90 tablet 1   • methylPREDNISolone (Medrol Dospak) 4 mg tablets Follow schedule on package instructions 21 tablet 0   • mometasone (Elocon) 0.1 % cream Apply topically once daily as needed (For itching). 15 g 3   • mometasone (Elocon) 0.1 % ointment Apply topically 2 times a day. Apply twice a day until the lesions are flat and smooth. 1 weeks on/1 week off, and you may repeat this. 45 g 5   • montelukast (Singulair) 10 mg tablet Take 1 tablet (10 mg) by mouth once daily. 30 tablet 11   • multivitamin tablet Take by mouth.     • mupirocin (Bactroban) 2 % ointment Apply topically twice a day.     • Nucala 100 mg/mL syringe INJECT 100MG SUBCUTANEOUSLY  EVERY 4 WEEKS 1 mL 11   • pantoprazole (ProtoNix) 40 mg EC tablet Take  1 tablet (40 mg) by mouth once daily in the morning. Take before meals. Do not crush, chew, or split. 90 tablet 1   • predniSONE (Deltasone) 10 mg tablet Take 1 tabs (10mg) TID for 5 days, then take 1 tabs (10mg) qAM for 5 days. 20 tablet 0   • tacrolimus (Protopic) 0.1 % ointment Apply to affected areas once or twice daily when needed for maintenance to prevent recurrence. 30 g 1   • triamcinolone (Kenalog) 0.5 % cream twice a day.       No current facility-administered medications for this visit.       Summary of the labs over the past 6 months:    Orders Only on 02/19/2025   Component Date Value Ref Range Status   • CHOLESTEROL, TOTAL 02/19/2025 148  <200 mg/dL Final   • HDL CHOLESTEROL 02/19/2025 38 (L)  > OR = 40 mg/dL Final   • TRIGLYCERIDES 02/19/2025 152 (H)  <150 mg/dL Final   • LDL-CHOLESTEROL 02/19/2025 85  mg/dL (calc) Final   • CHOL/HDLC RATIO 02/19/2025 3.9  <5.0 (calc) Final   • NON HDL CHOLESTEROL 02/19/2025 110  <130 mg/dL (calc) Final   • MAGNESIUM 02/19/2025 2.1  1.5 - 2.5 mg/dL Final   • GLUCOSE 02/19/2025 108 (H)  65 - 99 mg/dL Final   • UREA NITROGEN (BUN) 02/19/2025 16  7 - 25 mg/dL Final   • CREATININE 02/19/2025 0.91  0.60 - 1.29 mg/dL Final   • EGFR 02/19/2025 105  > OR = 60 mL/min/1.73m2 Final   • SODIUM 02/19/2025 141  135 - 146 mmol/L Final   • POTASSIUM 02/19/2025 4.6  3.5 - 5.3 mmol/L Final   • CHLORIDE 02/19/2025 105  98 - 110 mmol/L Final   • CARBON DIOXIDE 02/19/2025 29  20 - 32 mmol/L Final   • ELECTROLYTE BALANCE 02/19/2025 7  7 - 17 mmol/L (calc) Final   • CALCIUM 02/19/2025 9.2  8.6 - 10.3 mg/dL Final   • PROTEIN, TOTAL 02/19/2025 6.8  6.1 - 8.1 g/dL Final   • ALBUMIN 02/19/2025 4.4  3.6 - 5.1 g/dL Final   • BILIRUBIN, TOTAL 02/19/2025 0.7  0.2 - 1.2 mg/dL Final   • ALKALINE PHOSPHATASE 02/19/2025 67  36 - 130 U/L Final   • AST 02/19/2025 20  10 - 40 U/L Final   • ALT 02/19/2025 27  9 - 46 U/L Final   • WHITE BLOOD CELL COUNT 02/19/2025 4.8  3.8 - 10.8 Thousand/uL Final   • RED  BLOOD CELL COUNT 02/19/2025 4.67  4.20 - 5.80 Million/uL Final   • HEMOGLOBIN 02/19/2025 15.1  13.2 - 17.1 g/dL Final   • HEMATOCRIT 02/19/2025 44.6  38.5 - 50.0 % Final   • MCV 02/19/2025 95.5  80.0 - 100.0 fL Final   • MCH 02/19/2025 32.3  27.0 - 33.0 pg Final   • MCHC 02/19/2025 33.9  32.0 - 36.0 g/dL Final   • RDW 02/19/2025 12.5  11.0 - 15.0 % Final   • PLATELET COUNT 02/19/2025 201  140 - 400 Thousand/uL Final   • MPV 02/19/2025 10.4  7.5 - 12.5 fL Final   • ABSOLUTE NEUTROPHILS 02/19/2025 2,914  1,500 - 7,800 cells/uL Final   • ABSOLUTE LYMPHOCYTES 02/19/2025 1,042  850 - 3,900 cells/uL Final   • ABSOLUTE MONOCYTES 02/19/2025 605  200 - 950 cells/uL Final   • ABSOLUTE EOSINOPHILS 02/19/2025 221  15 - 500 cells/uL Final   • ABSOLUTE BASOPHILS 02/19/2025 19  0 - 200 cells/uL Final   • NEUTROPHILS 02/19/2025 60.7  % Final   • LYMPHOCYTES 02/19/2025 21.7  % Final   • MONOCYTES 02/19/2025 12.6  % Final   • EOSINOPHILS 02/19/2025 4.6  % Final   • BASOPHILS 02/19/2025 0.4  % Final   • TSH W/REFLEX TO FT4 02/19/2025 1.49  0.40 - 4.50 mIU/L Final   • VITAMIN D,25-OH,TOTAL,IA 02/19/2025 28 (L)  30 - 100 ng/mL Final   • HEMOGLOBIN A1c 02/19/2025 5.4  <5.7 % of total Hgb Final   • eAG (mg/dL) 02/19/2025 108  mg/dL Final   • eAG (mmol/L) 02/19/2025 6.0  mmol/L Final         Assessment/Plan   Diagnoses and all orders for this visit:  Non-seasonal allergic rhinitis, unspecified trigger  -     azelastine (Astelin) 137 mcg (0.1 %) nasal spray; Administer 1 spray into each nostril 2 times a day. Use in each nostril as directed    Nice to see you!    Due to history of CRS with Nasal polyposis, requiring multiple sinus surgeries/polypectomy (6 surgeries in total), requiring oral steroids, and not responsive (lack of efficacy) with Nucala injections.      Will  CONTINUE DUPIXENT injection q 2wks (sent by [facility]).    Continue xhance nasal  Continue Azelastine nasal  Continue Trelegy - this is maintaining his asthma control.    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    Follow-up in 6 months so we may re-assess you and develop a more long term plan.       Benjamin Hayes MD       Time was spent: 40 minutes - Preparing to see the patient, obtaining and/or reviewing separately obtained history, performing a medically necessary appropriate physical examination and/or evaluation, counseling and educating the patient/family, ordering medications, tests or procedures, referring and communicating with other providers, documenting clinical information in the medical record, independently interpreting results and communicating results to the patient/family, and care coordination.

## 2025-03-25 ENCOUNTER — APPOINTMENT (OUTPATIENT)
Dept: PULMONOLOGY | Facility: CLINIC | Age: 45
End: 2025-03-25
Payer: COMMERCIAL

## 2025-04-01 ENCOUNTER — APPOINTMENT (OUTPATIENT)
Dept: OTOLARYNGOLOGY | Facility: CLINIC | Age: 45
End: 2025-04-01
Payer: COMMERCIAL

## 2025-04-01 ENCOUNTER — TELEMEDICINE (OUTPATIENT)
Dept: PRIMARY CARE | Facility: CLINIC | Age: 45
End: 2025-04-01
Payer: COMMERCIAL

## 2025-04-01 VITALS — BODY MASS INDEX: 32.78 KG/M2 | HEIGHT: 70 IN | WEIGHT: 229 LBS | TEMPERATURE: 98.1 F

## 2025-04-01 DIAGNOSIS — J33.9 NASAL POLYPS: Primary | ICD-10-CM

## 2025-04-01 DIAGNOSIS — G47.33 OBSTRUCTIVE SLEEP APNEA: ICD-10-CM

## 2025-04-01 DIAGNOSIS — J30.89 SEASONAL AND PERENNIAL ALLERGIC RHINITIS: ICD-10-CM

## 2025-04-01 DIAGNOSIS — Z88.6 SAMTER'S TRIAD (HHS-HCC): ICD-10-CM

## 2025-04-01 DIAGNOSIS — K21.9 GERD WITHOUT ESOPHAGITIS: ICD-10-CM

## 2025-04-01 DIAGNOSIS — J40 BRONCHITIS: Primary | ICD-10-CM

## 2025-04-01 DIAGNOSIS — J45.909 SAMTER'S TRIAD (HHS-HCC): ICD-10-CM

## 2025-04-01 DIAGNOSIS — J06.9 VIRAL URI WITH COUGH: ICD-10-CM

## 2025-04-01 DIAGNOSIS — E78.00 PURE HYPERCHOLESTEROLEMIA: ICD-10-CM

## 2025-04-01 DIAGNOSIS — I10 ESSENTIAL HYPERTENSION: ICD-10-CM

## 2025-04-01 DIAGNOSIS — J33.9 SAMTER'S TRIAD (HHS-HCC): ICD-10-CM

## 2025-04-01 DIAGNOSIS — J30.2 SEASONAL AND PERENNIAL ALLERGIC RHINITIS: ICD-10-CM

## 2025-04-01 PROCEDURE — 1036F TOBACCO NON-USER: CPT | Performed by: OTOLARYNGOLOGY

## 2025-04-01 PROCEDURE — 3008F BODY MASS INDEX DOCD: CPT | Performed by: OTOLARYNGOLOGY

## 2025-04-01 PROCEDURE — 99213 OFFICE O/P EST LOW 20 MIN: CPT | Performed by: OTOLARYNGOLOGY

## 2025-04-01 PROCEDURE — 99214 OFFICE O/P EST MOD 30 MIN: CPT | Performed by: INTERNAL MEDICINE

## 2025-04-01 PROCEDURE — 1036F TOBACCO NON-USER: CPT | Performed by: INTERNAL MEDICINE

## 2025-04-01 RX ORDER — METHYLPREDNISOLONE 4 MG/1
TABLET ORAL
Qty: 21 TABLET | Refills: 0 | Status: SHIPPED | OUTPATIENT
Start: 2025-04-01 | End: 2025-04-07

## 2025-04-01 RX ORDER — AZITHROMYCIN 250 MG/1
TABLET, FILM COATED ORAL
Qty: 6 TABLET | Refills: 0 | Status: SHIPPED | OUTPATIENT
Start: 2025-04-01 | End: 2025-04-06

## 2025-04-01 ASSESSMENT — ENCOUNTER SYMPTOMS
HEADACHES: 0
DIARRHEA: 0
DIFFICULTY URINATING: 0
ABDOMINAL PAIN: 0
BLOOD IN STOOL: 0
FATIGUE: 1
SORE THROAT: 0
COUGH: 1
ARTHRALGIAS: 0
DIZZINESS: 0
FEVER: 0
PALPITATIONS: 0
BRUISES/BLEEDS EASILY: 0
SINUS PAIN: 0
WHEEZING: 0
UNEXPECTED WEIGHT CHANGE: 0
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
## 2025-04-01 NOTE — PROGRESS NOTES
Subjective   Patient ID: Wale Cali is a 44 y.o. male who presents for Virtual Visit (Nasal congestion, headache, cough, dark yellow phlegm ).    Virtual or Telephone Consent    An interactive audio and video telecommunication system which permits real time communications between the patient (at the originating site) and provider (at the distant site) was utilized to provide this telehealth service.   Verbal consent was requested and obtained from Wale Cali on this date, 04/01/25 for a telehealth visit and the patient's location was confirmed at the time of the visit.  -Headache and nasal congestion COVID testing negative sick for the last 3 days no fever  Mild bronchitis  Starting Z-Milind and Medrol Dosepak  Increase fluid intake rest follow-up if no improvement  - Right hand arthritis continue Voltaren as needed  --Left below knee amputation with a skin granulation tissue much better improved awaiting new prosthesis  --Hypertension controlled to continue with lisinopril doing well controlled, patient off amlodipine edema improved, no recurrence continue low-salt diet  - Spiculated lung lesion follow-up pulmonary, most likely inflammatory seen by pulmonary service recommendation CT showed granulomatous lung disease follow-up CT per pulmonary recommendation.  Compliance-Granulomatous lung disease Samter's syndrome between (nasal polyps and asthma aspirin sensitivity,) follow-up pulmonary in January as a scheduled after repeating scan  -Underlying sleep apnea follow-up otolaryngology as recommended.  -Hypercholesterolemia controlled   -Vitamin D deficiency continues vitamin D 5000 daily repeat vitamin D level  -Chronic reflux disease status post upper endoscopy symptoms are controlled only now with pantoprazole on diet-controlled.  Follow-up if no improvement             Review of Systems   Constitutional:  Positive for fatigue. Negative for fever and unexpected weight change.   HENT:   Positive for congestion. Negative for ear discharge, ear pain, mouth sores, sinus pain and sore throat.    Eyes:  Negative for visual disturbance.   Respiratory:  Positive for cough. Negative for wheezing.    Cardiovascular:  Negative for chest pain, palpitations and leg swelling.   Gastrointestinal:  Negative for abdominal pain, blood in stool and diarrhea.   Genitourinary:  Negative for difficulty urinating.   Musculoskeletal:  Negative for arthralgias.   Skin:  Negative for rash.   Neurological:  Negative for dizziness and headaches.   Hematological:  Does not bruise/bleed easily.   Psychiatric/Behavioral:  Negative for behavioral problems.    All other systems reviewed and are negative.      Objective   Lab Results   Component Value Date    HGBA1C 5.4 02/19/2025      There were no vitals taken for this visit.    Physical Exam  Constitutional:       General: He is not in acute distress.     Appearance: He is not ill-appearing, toxic-appearing or diaphoretic.   Pulmonary:      Effort: Pulmonary effort is normal. No respiratory distress.   Neurological:      Mental Status: He is alert.   Psychiatric:         Mood and Affect: Mood normal.         Assessment/Plan   Wale was seen today for virtual visit.  Diagnoses and all orders for this visit:  Bronchitis (Primary)  -     azithromycin (Zithromax) 250 mg tablet; Take 2 tablets (500 mg) by mouth once daily for 1 day, THEN 1 tablet (250 mg) once daily for 4 days. Take 2 tabs (500 mg) by mouth today, than 1 daily for 4 days..  -     methylPREDNISolone (Medrol Dospak) 4 mg tablets; Follow schedule on package instructions  Essential hypertension  GERD without esophagitis  Pure hypercholesterolemia   Virtual or Telephone Consent    An interactive audio and video telecommunication system which permits real time communications between the patient (at the originating site) and provider (at the distant site) was utilized to provide this telehealth service.   Verbal consent  was requested and obtained from Wale Cali on this date, 04/01/25 for a telehealth visit and the patient's location was confirmed at the time of the visit.  -Headache and nasal congestion COVID testing negative sick for the last 3 days no fever  Mild bronchitis  Starting Z-Milind and Medrol Dosepak  Increase fluid intake rest follow-up if no improvement  - Right hand arthritis continue Voltaren as needed  --Left below knee amputation with a skin granulation tissue much better improved awaiting new prosthesis  --Hypertension controlled to continue with lisinopril doing well controlled, patient off amlodipine edema improved, no recurrence continue low-salt diet  - Spiculated lung lesion follow-up pulmonary, most likely inflammatory seen by pulmonary service recommendation CT showed granulomatous lung disease follow-up CT per pulmonary recommendation.  Compliance-Granulomatous lung disease Samter's syndrome between (nasal polyps and asthma aspirin sensitivity,) follow-up pulmonary in January as a scheduled after repeating scan  -Underlying sleep apnea follow-up otolaryngology as recommended.  -Hypercholesterolemia controlled   -Vitamin D deficiency continues vitamin D 5000 daily repeat vitamin D level  -Chronic reflux disease status post upper endoscopy symptoms are controlled only now with pantoprazole on diet-controlled.  Follow-up if no improvement

## 2025-04-17 ENCOUNTER — OFFICE VISIT (OUTPATIENT)
Dept: WOUND CARE | Facility: HOSPITAL | Age: 45
End: 2025-04-17
Payer: COMMERCIAL

## 2025-04-17 PROCEDURE — 11042 DBRDMT SUBQ TIS 1ST 20SQCM/<: CPT | Mod: 59

## 2025-04-17 PROCEDURE — 99213 OFFICE O/P EST LOW 20 MIN: CPT

## 2025-04-24 ENCOUNTER — APPOINTMENT (OUTPATIENT)
Dept: OPHTHALMOLOGY | Facility: CLINIC | Age: 45
End: 2025-04-24
Payer: COMMERCIAL

## 2025-04-24 ENCOUNTER — OFFICE VISIT (OUTPATIENT)
Dept: WOUND CARE | Facility: HOSPITAL | Age: 45
End: 2025-04-24
Payer: COMMERCIAL

## 2025-04-24 DIAGNOSIS — H52.4 PRESBYOPIA: ICD-10-CM

## 2025-04-24 DIAGNOSIS — H52.13 BILATERAL MYOPIA: ICD-10-CM

## 2025-04-24 DIAGNOSIS — Z89.512 LEFT BELOW-KNEE AMPUTEE (MULTI): Primary | ICD-10-CM

## 2025-04-24 DIAGNOSIS — Z98.890 HX OF LASIK: ICD-10-CM

## 2025-04-24 DIAGNOSIS — H52.223 REGULAR ASTIGMATISM OF BOTH EYES: Primary | ICD-10-CM

## 2025-04-24 PROCEDURE — 92014 COMPRE OPH EXAM EST PT 1/>: CPT | Performed by: STUDENT IN AN ORGANIZED HEALTH CARE EDUCATION/TRAINING PROGRAM

## 2025-04-24 PROCEDURE — 11042 DBRDMT SUBQ TIS 1ST 20SQCM/<: CPT | Mod: LT

## 2025-04-24 ASSESSMENT — REFRACTION_MANIFEST
OS_CYLINDER: SPHERE
OD_CYLINDER: +0.75
OD_CYLINDER: +0.50
OS_ADD: +1.75
OS_CYLINDER: +0.50
OS_SPHERE: -0.50
METHOD_AUTOREFRACTION: 1
OD_AXIS: 020
OD_SPHERE: -0.75
OS_SPHERE: -0.75
OD_ADD: +1.75
OD_AXIS: 020
OD_SPHERE: -0.50
OS_AXIS: 126

## 2025-04-24 ASSESSMENT — ENCOUNTER SYMPTOMS
CARDIOVASCULAR NEGATIVE: 0
EYES NEGATIVE: 1
HEMATOLOGIC/LYMPHATIC NEGATIVE: 0
ENDOCRINE NEGATIVE: 0
MUSCULOSKELETAL NEGATIVE: 0
RESPIRATORY NEGATIVE: 0
ALLERGIC/IMMUNOLOGIC NEGATIVE: 0
PSYCHIATRIC NEGATIVE: 0
NEUROLOGICAL NEGATIVE: 0
CONSTITUTIONAL NEGATIVE: 0
GASTROINTESTINAL NEGATIVE: 0

## 2025-04-24 ASSESSMENT — REFRACTION_WEARINGRX
OS_AXIS: 126
OD_SPHERE: -0.50
OS_SPHERE: -0.75
OD_ADD: +1.75
OS_CYLINDER: +0.75
OD_AXIS: 015
OS_ADD: +1.75
OD_CYLINDER: +0.75

## 2025-04-24 ASSESSMENT — EXTERNAL EXAM - RIGHT EYE: OD_EXAM: NORMAL

## 2025-04-24 ASSESSMENT — CONF VISUAL FIELD
OD_INFERIOR_TEMPORAL_RESTRICTION: 0
OS_NORMAL: 1
OD_SUPERIOR_TEMPORAL_RESTRICTION: 0
OD_SUPERIOR_NASAL_RESTRICTION: 0
OS_INFERIOR_NASAL_RESTRICTION: 0
OD_NORMAL: 1
OD_INFERIOR_NASAL_RESTRICTION: 0
OS_SUPERIOR_TEMPORAL_RESTRICTION: 0
OS_INFERIOR_TEMPORAL_RESTRICTION: 0
OS_SUPERIOR_NASAL_RESTRICTION: 0

## 2025-04-24 ASSESSMENT — TONOMETRY
IOP_METHOD: GOLDMANN APPLANATION
OD_IOP_MMHG: 20
OS_IOP_MMHG: 20

## 2025-04-24 ASSESSMENT — VISUAL ACUITY
OS_SC: 20/20
OD_SC: 20/20
METHOD: SNELLEN - LINEAR

## 2025-04-24 ASSESSMENT — CUP TO DISC RATIO
OD_RATIO: .45
OS_RATIO: .4

## 2025-04-24 ASSESSMENT — EXTERNAL EXAM - LEFT EYE: OS_EXAM: NORMAL

## 2025-04-24 NOTE — PROGRESS NOTES
Assessment/Plan   Diagnoses and all orders for this visit:  Regular astigmatism of both eyes  Presbyopia  Hx of LASIK  New spec rx released today per patient request, optional to fill. Ocular health wnl for age OU. Monitor 1 year or sooner prn. Refraction billed today.   Patient prefers NVO spec RX at this time  Notices some difficulty with vision adjusting going from near work to distance    History of sarcoidosis  No signs of active inflammation. Monitor 1 year or sooner with new symptoms.    Dry eye syndrome of both eyes  AT's PRN    Family History of AMD  -Discussed imporantance of sun protection outdoors, not smoking, healthy diet and exercise.   -macula is healthy on exam today      RTC 1 year for annual with BLAKE REDD

## 2025-05-08 ENCOUNTER — APPOINTMENT (OUTPATIENT)
Dept: WOUND CARE | Facility: HOSPITAL | Age: 45
End: 2025-05-08
Payer: COMMERCIAL

## 2025-05-16 ENCOUNTER — APPOINTMENT (OUTPATIENT)
Dept: DERMATOLOGY | Facility: CLINIC | Age: 45
End: 2025-05-16
Payer: COMMERCIAL

## 2025-05-16 DIAGNOSIS — D86.9 SARCOIDOSIS: ICD-10-CM

## 2025-05-16 NOTE — PROGRESS NOTES
Subjective     Wale Cali is a 45 y.o. male who presents for the following: Sarcodosis.     Review of Systems:  No other skin or systemic complaints other than what is documented elsewhere in the note.    The following portions of the chart were reviewed this encounter and updated as appropriate:   Tobacco  Allergies  Meds  Problems  Med Hx  Surg Hx         Skin Cancer History  Biopsy Log Book  No skin cancers from Specimen Tracking.    Additional History      Specialty Problems          Dermatology Problems    Skin lesion        Objective   Well appearing patient in no apparent distress; mood and affect are within normal limits.    A focused skin examination was performed. All findings within normal limits unless otherwise noted below.    Assessment/Plan   Skin Exam  1. SARCOIDOSIS  Left knee, Left deltoid  12 x 35 mm tan patch, no atrophy. Red and a little raised on the lateral half.  4 x 5 mm reddish brown papule superior to left knee. There are a collection of small papules within tattoo on the left deltoid area.   This is a 45 y.o. male  following up for sarcoidosis. Left knee is stable in size but lateral half is tender and raised a little. 1 new lesion superior knee area. Also increased lesions to left deltoid. Patient is here for ILK. Continue with tacrolimus, can increase to twice daily. Has received his new prosthetic but develop numerous blisters. Distal stump is healing well at this time. Advised to apply plain vaseline to the area. Also for dry skin on the right foot.     Discussed risks, benefits, and alternatives to intralesional kenalog injections were discussed. Kenalog injections side effects of intra-lesional kenalog injections include depressed scar, stretch marks, discoloration (lighter colored skin), pain with injection, and low risk of infection. These changes are not expected based on the dose and amount of medication to be injected.Other treatment options include  radiation, excision, cryotherapy, and laser treatments.     The patient wants to proceed with intralesional kenalog.  Pt expresses understanding of this risks and verbal consent was obtained from the patient to treat with intralesional Kenalog.  -Intralesional kenalog  10mg/ml and a total of  0.8 ml was injected to affected area(s) after prepping the skin with alcohol. Pt tolerated the procedure well with no complications. A total of 5 lesion(s) were treated.     The following information regarding the use of topical steroids was provided: Local skin thinning,striae, and telangiectasia can occur with chronic application of this medication.  Long term use oftopical steroids should be avoided    Related Procedures  Follow Up In Dermatology - Established Patient  Follow Up In Dermatology - Established Patient  Related Medications  clobetasol (Temovate) 0.05 % ointment  Apply to affected area daily for 1 week then break for 1 week then repeat 1 week on and 1 week off.  tacrolimus (Protopic) 0.1 % ointment  Apply to affected areas once or twice daily when needed for maintenance to prevent recurrence.    Return in 2 months or sooner if needed.

## 2025-05-16 NOTE — Clinical Note
12 x 35 mm tan patch, no atrophy. Red and a little raised on the lateral half.  4 x 5 mm reddish brown papule superior to left knee. There are a collection of small papules within tattoo on the left deltoid area.

## 2025-05-16 NOTE — Clinical Note
This is a 45 y.o. male  following up for sarcoidosis. Left knee is stable in size but lateral half is tender and raised a little. 1 new lesion superior knee area. Also increased lesions to left deltoid. Patient is here for ILK. Continue with tacrolimus, can increase to twice daily. Has received his new prosthetic but develop numerous blisters. Distal stump is healing well at this time. Advised to apply plain vaseline to the area. Also for dry skin on the right foot.     Discussed risks, benefits, and alternatives to intralesional kenalog injections were discussed. Kenalog injections side effects of intra-lesional kenalog injections include depressed scar, stretch marks, discoloration (lighter colored skin), pain with injection, and low risk of infection. These changes are not expected based on the dose and amount of medication to be injected.Other treatment options include radiation, excision, cryotherapy, and laser treatments.     The patient wants to proceed with intralesional kenalog.  Pt expresses understanding of this risks and verbal consent was obtained from the patient to treat with intralesional Kenalog.  -Intralesional kenalog  10mg/ml and a total of  0.8 ml was injected to affected area(s) after prepping the skin with alcohol. Pt tolerated the procedure well with no complications. A total of 5 lesion(s) were treated.     The following information regarding the use of topical steroids was provided: Local skin thinning,striae, and telangiectasia can occur with chronic application of this medication.  Long term use oftopical steroids should be avoided

## 2025-05-22 ENCOUNTER — APPOINTMENT (OUTPATIENT)
Dept: PRIMARY CARE | Facility: CLINIC | Age: 45
End: 2025-05-22
Payer: COMMERCIAL

## 2025-05-22 VITALS
TEMPERATURE: 97.1 F | DIASTOLIC BLOOD PRESSURE: 84 MMHG | SYSTOLIC BLOOD PRESSURE: 120 MMHG | HEART RATE: 66 BPM | WEIGHT: 237 LBS | BODY MASS INDEX: 34.01 KG/M2 | OXYGEN SATURATION: 99 %

## 2025-05-22 DIAGNOSIS — E78.00 PURE HYPERCHOLESTEROLEMIA: ICD-10-CM

## 2025-05-22 DIAGNOSIS — I10 ESSENTIAL HYPERTENSION: Primary | ICD-10-CM

## 2025-05-22 DIAGNOSIS — S88.912S AMPUTATION OF LEFT LOWER EXTREMITY, SEQUELA: ICD-10-CM

## 2025-05-22 DIAGNOSIS — J84.10 GRANULOMATOUS LUNG DISEASE (MULTI): ICD-10-CM

## 2025-05-22 PROCEDURE — 3079F DIAST BP 80-89 MM HG: CPT | Performed by: INTERNAL MEDICINE

## 2025-05-22 PROCEDURE — 99214 OFFICE O/P EST MOD 30 MIN: CPT | Performed by: INTERNAL MEDICINE

## 2025-05-22 PROCEDURE — 3074F SYST BP LT 130 MM HG: CPT | Performed by: INTERNAL MEDICINE

## 2025-05-22 PROCEDURE — 1036F TOBACCO NON-USER: CPT | Performed by: INTERNAL MEDICINE

## 2025-05-22 RX ORDER — ATORVASTATIN CALCIUM 80 MG/1
80 TABLET, FILM COATED ORAL DAILY
Qty: 90 TABLET | Refills: 1 | Status: SHIPPED | OUTPATIENT
Start: 2025-05-22

## 2025-05-22 RX ORDER — LISINOPRIL 20 MG/1
20 TABLET ORAL DAILY
Qty: 90 TABLET | Refills: 1 | Status: SHIPPED | OUTPATIENT
Start: 2025-05-22

## 2025-05-22 ASSESSMENT — ENCOUNTER SYMPTOMS
BRUISES/BLEEDS EASILY: 0
COUGH: 0
FATIGUE: 0
WHEEZING: 0
UNEXPECTED WEIGHT CHANGE: 0
SORE THROAT: 0
HEADACHES: 0
FEVER: 0
BLOOD IN STOOL: 0
DIFFICULTY URINATING: 0
ARTHRALGIAS: 0
ABDOMINAL PAIN: 0
PALPITATIONS: 0
DIZZINESS: 0
DIARRHEA: 0
SINUS PAIN: 0

## 2025-05-22 NOTE — PROGRESS NOTES
Subjective   Patient ID: Wale Cali is a 45 y.o. male who presents for Hyperlipidemia, Leg Injury (Left leg prosthetic issues ), and Results (BW).    --Recent blood work and plan of care reviewed  - Screen for diabetes negative hemoglobin A1c 5.4  - Borderline hypertriglyceridemia counseled about low triglyceride diet exercise weight loss  -Recently patient had left lower extremity new prosthesis was not fitting right developed superficial pressure ulcer now healing well patient cut down on exercise and walking going to resume from today after healing pressure discoloration of the skin no signs of infection patient will be monitored closely as needed  -Left below-knee amputation and prosthesis compensated  --Hypertension controlled to continue with lisinopril doing well controlled, patient off amlodipine edema improved, no recurrence continue low-salt diet  - Granulomatous lung disease compensated under care of pulmonary Dr. Frost scheduled to see him again in 1 year follow-up closely as needed patient denies any complaints  -Granulomatous lung disease Samter's syndrome between (nasal polyps and asthma aspirin sensitivity,)   -Underlying sleep apnea follow-up otolaryngology as recommended.  -Hypercholesterolemia controlled   -Vitamin D deficiency continues vitamin D 5000 daily repeat vitamin D level  -Chronic reflux disease status post upper endoscopy symptoms are controlled only now with pantoprazole on diet-controlled.  Follow-up if no improvement  Follow-up 6 months         Hyperlipidemia  Pertinent negatives include no chest pain.          Review of Systems   Constitutional:  Negative for fatigue, fever and unexpected weight change.   HENT:  Negative for congestion, ear discharge, ear pain, mouth sores, sinus pain and sore throat.    Eyes:  Negative for visual disturbance.   Respiratory:  Negative for cough and wheezing.    Cardiovascular:  Negative for chest pain, palpitations and leg  swelling.   Gastrointestinal:  Negative for abdominal pain, blood in stool and diarrhea.   Genitourinary:  Negative for difficulty urinating.   Musculoskeletal:  Negative for arthralgias.   Skin:  Negative for rash.   Neurological:  Negative for dizziness and headaches.   Hematological:  Does not bruise/bleed easily.   Psychiatric/Behavioral:  Negative for behavioral problems.    All other systems reviewed and are negative.      Objective   Lab Results   Component Value Date    HGBA1C 5.4 02/19/2025      /84   Pulse 66   Temp 36.2 °C (97.1 °F)   Wt 108 kg (237 lb)   SpO2 99%   BMI 34.01 kg/m²   Lab Results   Component Value Date    WBC 4.8 02/19/2025    HGB 15.1 02/19/2025    HCT 44.6 02/19/2025     02/19/2025    CHOL 148 02/19/2025    TRIG 152 (H) 02/19/2025    HDL 38 (L) 02/19/2025    ALT 27 02/19/2025    AST 20 02/19/2025     02/19/2025    K 4.6 02/19/2025     02/19/2025    CREATININE 0.91 02/19/2025    BUN 16 02/19/2025    CO2 29 02/19/2025    TSH 1.49 02/19/2025    INR 1.1 06/10/2021    HGBA1C 5.4 02/19/2025     par   Physical Exam  Vitals and nursing note reviewed.   Constitutional:       General: He is not in acute distress.     Appearance: Normal appearance. He is not ill-appearing, toxic-appearing or diaphoretic.   HENT:      Head: Normocephalic.      Nose: Nose normal.   Eyes:      Conjunctiva/sclera: Conjunctivae normal.      Pupils: Pupils are equal, round, and reactive to light.   Cardiovascular:      Rate and Rhythm: Regular rhythm.   Pulmonary:      Effort: Pulmonary effort is normal. No respiratory distress.      Breath sounds: Normal breath sounds.   Abdominal:      General: Abdomen is flat.      Palpations: Abdomen is soft.   Musculoskeletal:      Cervical back: Neck supple.      Comments: Left below-knee amputation prosthetic leg   Skin:     General: Skin is warm.   Neurological:      General: No focal deficit present.      Mental Status: He is alert and oriented to  person, place, and time.   Psychiatric:         Mood and Affect: Mood normal.         Assessment/Plan   Wale was seen today for hyperlipidemia, leg injury and results.  Diagnoses and all orders for this visit:  Essential hypertension (Primary)  -     lisinopril 20 mg tablet; Take 1 tablet (20 mg) by mouth once daily.  Pure hypercholesterolemia  -     atorvastatin (Lipitor) 80 mg tablet; Take 1 tablet (80 mg) by mouth once daily.  Granulomatous lung disease (Multi)  Amputation of left lower extremity, sequela  Other orders  -     Follow Up In Primary Care - Established  -     Follow Up In Primary Care - Established; Future   -Recent blood work and plan of care reviewed  - Screen for diabetes negative hemoglobin A1c 5.4  - Borderline hypertriglyceridemia counseled about low triglyceride diet exercise weight loss  -Recently patient had left lower extremity new prosthesis was not fitting right developed superficial pressure ulcer now healing well patient cut down on exercise and walking going to resume from today after healing pressure discoloration of the skin no signs of infection patient will be monitored closely as needed  -Left below-knee amputation and prosthesis compensated  --Hypertension controlled to continue with lisinopril doing well controlled, patient off amlodipine edema improved, no recurrence continue low-salt diet  - Granulomatous lung disease compensated under care of pulmonary Dr. Frost scheduled to see him again in 1 year follow-up closely as needed patient denies any complaints  -Granulomatous lung disease Samter's syndrome between (nasal polyps and asthma aspirin sensitivity,)   -Underlying sleep apnea follow-up otolaryngology as recommended.  -Hypercholesterolemia controlled   -Vitamin D deficiency continues vitamin D 5000 daily repeat vitamin D level  -Chronic reflux disease status post upper endoscopy symptoms are controlled only now with pantoprazole on diet-controlled.  Follow-up if  no improvement  Follow-up 6 months

## 2025-07-18 ENCOUNTER — APPOINTMENT (OUTPATIENT)
Dept: DERMATOLOGY | Facility: CLINIC | Age: 45
End: 2025-07-18
Payer: COMMERCIAL

## 2025-07-18 DIAGNOSIS — D86.9 SARCOIDOSIS: ICD-10-CM

## 2025-07-18 PROCEDURE — 1036F TOBACCO NON-USER: CPT | Performed by: NURSE PRACTITIONER

## 2025-07-18 PROCEDURE — 11900 INJECT SKIN LESIONS </W 7: CPT | Performed by: NURSE PRACTITIONER

## 2025-07-18 NOTE — Clinical Note
12 x 35 mm tan patch, no atrophy with 2 red papules on the medial aspect. There are a collection of small papules within tattoo on the left deltoid area.

## 2025-07-18 NOTE — PROGRESS NOTES
Subjective     Wale Cali is a 45 y.o. male who presents for the following: Sarcoidosis.     Review of Systems:  No other skin or systemic complaints other than what is documented elsewhere in the note.    The following portions of the chart were reviewed this encounter and updated as appropriate:          Skin Cancer History  Biopsy Log Book  No skin cancers from Specimen Tracking.    Additional History      Specialty Problems          Dermatology Problems    Skin lesion        Objective   Well appearing patient in no apparent distress; mood and affect are within normal limits.    A focused skin examination was performed. All findings within normal limits unless otherwise noted below.    Assessment/Plan   Skin Exam  1. SARCOIDOSIS  Left knee, Left deltoid  12 x 35 mm tan patch, no atrophy with 2 red papules on the medial aspect. There are a collection of small papules within tattoo on the left deltoid area.   This is a 45 y.o. male  following up for sarcoidosis. Left knee is looking much better. Only 2 small areas on the medial aspect. Left deltoid has more lesions. He has continued to apply tacrolimus BID. If not improving by follow up in 4 weeks will consider change in topical therapy. Will proceed with ILK today.    Discussed risks, benefits, and alternatives to intralesional kenalog injections were discussed. Kenalog injections side effects of intra-lesional kenalog injections include depressed scar, stretch marks, discoloration (lighter colored skin), pain with injection, and low risk of infection. These changes are not expected based on the dose and amount of medication to be injected.Other treatment options include radiation, excision, cryotherapy, and laser treatments.     The patient wants to proceed with intralesional kenalog.  Pt expresses understanding of this risks and verbal consent was obtained from the patient to treat with intralesional Kenalog.  -Intralesional kenalog  10mg/ml and  a total of  0.6 ml was injected to affected area(s) after prepping the skin with alcohol. Pt tolerated the procedure well with no complications. A total of 5 lesion(s) were treated.     The following information regarding the use of topical steroids was provided: Local skin thinning,striae, and telangiectasia can occur with chronic application of this medication.  Long term use oftopical steroids should be avoided    This Visit  - Follow Up In Dermatology - Established Patient  Existing Treatments  - clobetasol (Temovate) 0.05 % ointment - Apply to affected area daily for 1 week then break for 1 week then repeat 1 week on and 1 week off.  - tacrolimus (Protopic) 0.1 % ointment - Apply to affected areas once or twice daily when needed for maintenance to prevent recurrence.    Return in 4 weeks

## 2025-07-18 NOTE — Clinical Note
This is a 45 y.o. male  following up for sarcoidosis. Left knee is looking much better. Only 2 small areas on the medial aspect. Left deltoid has more lesions. He has continued to apply tacrolimus BID. If not improving by follow up in 4 weeks will consider change in topical therapy. Will proceed with ILK today.    Discussed risks, benefits, and alternatives to intralesional kenalog injections were discussed. Kenalog injections side effects of intra-lesional kenalog injections include depressed scar, stretch marks, discoloration (lighter colored skin), pain with injection, and low risk of infection. These changes are not expected based on the dose and amount of medication to be injected.Other treatment options include radiation, excision, cryotherapy, and laser treatments.     The patient wants to proceed with intralesional kenalog.  Pt expresses understanding of this risks and verbal consent was obtained from the patient to treat with intralesional Kenalog.  -Intralesional kenalog  10mg/ml and a total of  0.6 ml was injected to affected area(s) after prepping the skin with alcohol. Pt tolerated the procedure well with no complications. A total of 5 lesion(s) were treated.     The following information regarding the use of topical steroids was provided: Local skin thinning,striae, and telangiectasia can occur with chronic application of this medication.  Long term use oftopical steroids should be avoided

## 2025-08-11 DIAGNOSIS — K21.9 GERD WITHOUT ESOPHAGITIS: ICD-10-CM

## 2025-08-11 RX ORDER — PANTOPRAZOLE SODIUM 40 MG/1
TABLET, DELAYED RELEASE ORAL
Qty: 90 TABLET | Refills: 0 | Status: SHIPPED | OUTPATIENT
Start: 2025-08-11

## 2025-08-18 DIAGNOSIS — J33.8 NASAL SINUS POLYP: ICD-10-CM

## 2025-08-18 DIAGNOSIS — J45.50 SEVERE PERSISTENT ASTHMA, UNSPECIFIED WHETHER COMPLICATED (MULTI): ICD-10-CM

## 2025-08-18 RX ORDER — FLUTICASONE FUROATE, UMECLIDINIUM BROMIDE AND VILANTEROL TRIFENATATE 200; 62.5; 25 UG/1; UG/1; UG/1
1 POWDER RESPIRATORY (INHALATION) DAILY
Qty: 60 EACH | Refills: 11 | Status: SHIPPED | OUTPATIENT
Start: 2025-08-18

## 2025-08-20 ENCOUNTER — APPOINTMENT (OUTPATIENT)
Dept: ALLERGY | Facility: CLINIC | Age: 45
End: 2025-08-20
Payer: COMMERCIAL

## 2025-08-20 ENCOUNTER — OFFICE VISIT (OUTPATIENT)
Dept: DERMATOLOGY | Facility: CLINIC | Age: 45
End: 2025-08-20
Payer: COMMERCIAL

## 2025-08-20 VITALS — DIASTOLIC BLOOD PRESSURE: 83 MMHG | SYSTOLIC BLOOD PRESSURE: 147 MMHG

## 2025-08-20 DIAGNOSIS — J45.50 SEVERE PERSISTENT ASTHMA, UNSPECIFIED WHETHER COMPLICATED (MULTI): ICD-10-CM

## 2025-08-20 DIAGNOSIS — J30.89 NON-SEASONAL ALLERGIC RHINITIS, UNSPECIFIED TRIGGER: ICD-10-CM

## 2025-08-20 DIAGNOSIS — J30.1 ALLERGIC RHINITIS DUE TO POLLEN, UNSPECIFIED SEASONALITY: ICD-10-CM

## 2025-08-20 DIAGNOSIS — J30.2 SEASONAL ALLERGIC RHINITIS, UNSPECIFIED TRIGGER: ICD-10-CM

## 2025-08-20 DIAGNOSIS — J30.9 ALLERGIC RHINITIS, UNSPECIFIED SEASONALITY, UNSPECIFIED TRIGGER: ICD-10-CM

## 2025-08-20 DIAGNOSIS — J33.8 NASAL SINUS POLYP: Primary | ICD-10-CM

## 2025-08-20 DIAGNOSIS — D86.9 SARCOIDOSIS: Primary | ICD-10-CM

## 2025-08-20 PROCEDURE — 99215 OFFICE O/P EST HI 40 MIN: CPT | Performed by: ALLERGY & IMMUNOLOGY

## 2025-08-20 PROCEDURE — 99213 OFFICE O/P EST LOW 20 MIN: CPT | Performed by: NURSE PRACTITIONER

## 2025-08-20 PROCEDURE — 3079F DIAST BP 80-89 MM HG: CPT | Performed by: ALLERGY & IMMUNOLOGY

## 2025-08-20 PROCEDURE — 3077F SYST BP >= 140 MM HG: CPT | Performed by: ALLERGY & IMMUNOLOGY

## 2025-08-20 PROCEDURE — 1036F TOBACCO NON-USER: CPT | Performed by: NURSE PRACTITIONER

## 2025-08-20 RX ORDER — MONTELUKAST SODIUM 10 MG/1
10 TABLET ORAL DAILY
Qty: 30 TABLET | Refills: 11 | Status: SHIPPED | OUTPATIENT
Start: 2025-08-20

## 2025-08-20 RX ORDER — AZELASTINE 1 MG/ML
1 SPRAY, METERED NASAL 2 TIMES DAILY
Qty: 90 ML | Refills: 3 | Status: SHIPPED | OUTPATIENT
Start: 2025-08-20

## 2025-08-22 ENCOUNTER — APPOINTMENT (OUTPATIENT)
Dept: PULMONOLOGY | Facility: CLINIC | Age: 45
End: 2025-08-22
Payer: COMMERCIAL

## 2025-09-09 ENCOUNTER — APPOINTMENT (OUTPATIENT)
Dept: PULMONOLOGY | Facility: CLINIC | Age: 45
End: 2025-09-09
Payer: COMMERCIAL

## 2025-10-21 ENCOUNTER — APPOINTMENT (OUTPATIENT)
Dept: DERMATOLOGY | Facility: CLINIC | Age: 45
End: 2025-10-21
Payer: COMMERCIAL

## 2025-11-20 ENCOUNTER — APPOINTMENT (OUTPATIENT)
Dept: PRIMARY CARE | Facility: CLINIC | Age: 45
End: 2025-11-20
Payer: COMMERCIAL

## 2026-01-21 ENCOUNTER — APPOINTMENT (OUTPATIENT)
Dept: ALLERGY | Facility: CLINIC | Age: 46
End: 2026-01-21
Payer: COMMERCIAL

## 2026-04-27 ENCOUNTER — APPOINTMENT (OUTPATIENT)
Dept: OPHTHALMOLOGY | Facility: CLINIC | Age: 46
End: 2026-04-27
Payer: COMMERCIAL